# Patient Record
Sex: FEMALE | Race: BLACK OR AFRICAN AMERICAN | NOT HISPANIC OR LATINO | Employment: OTHER | URBAN - METROPOLITAN AREA
[De-identification: names, ages, dates, MRNs, and addresses within clinical notes are randomized per-mention and may not be internally consistent; named-entity substitution may affect disease eponyms.]

---

## 2017-01-25 ENCOUNTER — HOSPITAL ENCOUNTER (OUTPATIENT)
Dept: RADIOLOGY | Facility: HOSPITAL | Age: 57
Discharge: HOME | End: 2017-01-25
Attending: INTERNAL MEDICINE
Payer: COMMERCIAL

## 2017-01-25 DIAGNOSIS — R06.2 WHEEZING: ICD-10-CM

## 2017-01-25 PROCEDURE — 71020 XR CHEST PA AND LATERAL: CPT | Mod: TC

## 2017-01-25 PROCEDURE — 71020 XR CHEST PA AND LATERAL: CPT | Mod: 26,,, | Performed by: RADIOLOGY

## 2017-02-03 ENCOUNTER — HOSPITAL ENCOUNTER (OUTPATIENT)
Dept: RADIOLOGY | Facility: HOSPITAL | Age: 57
Discharge: HOME | End: 2017-02-03
Attending: INTERNAL MEDICINE
Payer: COMMERCIAL

## 2017-02-03 DIAGNOSIS — Z12.31 VISIT FOR SCREENING MAMMOGRAM: ICD-10-CM

## 2017-02-03 PROCEDURE — 77067 SCR MAMMO BI INCL CAD: CPT | Mod: 26,,, | Performed by: RADIOLOGY

## 2017-02-03 PROCEDURE — 77067 SCR MAMMO BI INCL CAD: CPT | Mod: TC

## 2018-02-07 ENCOUNTER — OFFICE VISIT (OUTPATIENT)
Dept: PLASTIC SURGERY | Facility: CLINIC | Age: 58
End: 2018-02-07
Payer: COMMERCIAL

## 2018-02-07 VITALS
TEMPERATURE: 98 F | DIASTOLIC BLOOD PRESSURE: 88 MMHG | SYSTOLIC BLOOD PRESSURE: 134 MMHG | HEIGHT: 66 IN | HEART RATE: 81 BPM | BODY MASS INDEX: 35.36 KG/M2 | WEIGHT: 220 LBS

## 2018-02-07 DIAGNOSIS — M54.50 CHRONIC BILATERAL LOW BACK PAIN WITHOUT SCIATICA: ICD-10-CM

## 2018-02-07 DIAGNOSIS — G89.29 CHRONIC NECK PAIN: ICD-10-CM

## 2018-02-07 DIAGNOSIS — N62 MACROMASTIA: Primary | ICD-10-CM

## 2018-02-07 DIAGNOSIS — G89.29 CHRONIC BILATERAL LOW BACK PAIN WITHOUT SCIATICA: ICD-10-CM

## 2018-02-07 DIAGNOSIS — R21 EXCORIATED RASH: ICD-10-CM

## 2018-02-07 DIAGNOSIS — M54.2 CHRONIC NECK PAIN: ICD-10-CM

## 2018-02-07 PROCEDURE — 3008F BODY MASS INDEX DOCD: CPT | Mod: S$GLB,,, | Performed by: SURGERY

## 2018-02-07 PROCEDURE — 99999 PR PBB SHADOW E&M-EST. PATIENT-LVL III: CPT | Mod: PBBFAC,,, | Performed by: SURGERY

## 2018-02-07 PROCEDURE — 99204 OFFICE O/P NEW MOD 45 MIN: CPT | Mod: S$GLB,,, | Performed by: SURGERY

## 2018-02-07 NOTE — PROGRESS NOTES
History & Physical    SUBJECTIVE:   Chief complaint: large breasts    History of Present Illness:  Patient is a 57 y.o. female presents with symptomatic bilateral large pendulous  breasts. She is 42G. States that she has back and neck pain for greater than 10 years. Takes muscle relaxant, ibuprofen, and tylenol for this. She also participates in physical therapy and exercise program to relieve back and neck pain for 7 years. Complaints of shoulder grooving from hussein straps and rashes under the breast for which she uses baby powder and cream, and triple oitment abx. Also has macerating rashes during summer time. She is active. Works as a disabled.    Past Medical History:   Diagnosis Date    Chronic pain     Fibromyalgia     Hypertension        History reviewed. No pertinent surgical history.    History reviewed. No pertinent family history.    Social History     Social History    Marital status:      Spouse name: N/A    Number of children: N/A    Years of education: N/A     Social History Main Topics    Smoking status: Current Some Day Smoker    Smokeless tobacco: None    Alcohol use No    Drug use: Unknown    Sexual activity: Not Asked     Other Topics Concern    None     Social History Narrative    None       Current Outpatient Prescriptions   Medication Sig Dispense Refill    atenolol (TENORMIN) 50 MG tablet Take 50 mg by mouth once daily.      duloxetine (CYMBALTA) 60 MG capsule Take 60 mg by mouth once daily.      gabapentin (NEURONTIN) 300 MG capsule Take 300 mg by mouth 3 (three) times daily.      hydrochlorothiazide (HYDRODIURIL) 25 MG tablet Take 25 mg by mouth once daily.      hydrocodone-acetaminophen 7.5-325mg (NORCO) 7.5-325 mg per tablet Take 1 tablet by mouth every 6 (six) hours as needed for Pain.      oxycodone-acetaminophen (PERCOCET) 5-325 mg per tablet Take 1 tablet by mouth every 4 (four) hours as needed for Pain. 20 tablet 0    pregabalin (LYRICA) 75 MG capsule  "Take 75 mg by mouth 2 (two) times daily.       No current facility-administered medications for this visit.        Review of patient's allergies indicates:  No Known Allergies      Review of Systems:    Review of Systems   HENT: Positive for neck pain.    Musculoskeletal: Positive for back pain.   Neurological: Positive for headaches. dizziness      OBJECTIVE:     /88   Pulse 81   Temp 98 °F (36.7 °C) (Oral)   Ht 5' 6" (1.676 m)   Wt 99.8 kg (220 lb)   BMI 35.51 kg/m²       Physical Exam:    Physical Exam   Constitutional: She is oriented to person, place, and time. She appears well-developed and well-nourished.   Neck: Normal range of motion. Neck supple. No tracheal deviation present.   Cardiovascular: Normal rate, regular rhythm and normal heart sounds.    Pulmonary/Chest: Effort normal and breath sounds normal. bilaterally enlarged breasts, evidence of previous rashes, shoulder grooving, no palpable masses, nipple everted  Abdominal: Soft. Bowel sounds are normal.   Musculoskeletal: Normal range of motion.   Neurological: She is alert and oriented to person, place, and time.   Skin: Skin is warm.           ASSESSMENT/PLAN:     1.Symptomatic Bilateral Macromastia  2. Chronic neck pain  3. Chronic back pain  4. Chronic breast rashes    PLAN:Plan    -Will need 2000 gm reduction per side, will need free nipple graft vs tattoo   -Will submit paper work for insurance approval  -Photos obtained  -Risk, benefits, and alternatives explained.    "

## 2018-03-20 ENCOUNTER — TELEPHONE (OUTPATIENT)
Dept: PLASTIC SURGERY | Facility: CLINIC | Age: 58
End: 2018-03-20

## 2018-04-09 ENCOUNTER — TELEPHONE (OUTPATIENT)
Dept: PLASTIC SURGERY | Facility: CLINIC | Age: 58
End: 2018-04-09

## 2018-04-23 ENCOUNTER — TELEPHONE (OUTPATIENT)
Dept: PLASTIC SURGERY | Facility: CLINIC | Age: 58
End: 2018-04-23

## 2018-04-23 NOTE — TELEPHONE ENCOUNTER
I've made several attempts to schedule Ms. Tripathi's surgery with no avail. The number listed is no longer in service.

## 2018-05-04 DIAGNOSIS — M54.9 BACK PAIN, UNSPECIFIED BACK LOCATION, UNSPECIFIED BACK PAIN LATERALITY, UNSPECIFIED CHRONICITY: ICD-10-CM

## 2018-05-04 DIAGNOSIS — N62 MACROMASTIA: Primary | ICD-10-CM

## 2018-05-04 DIAGNOSIS — G89.29 CHRONIC LOW BACK PAIN WITHOUT SCIATICA, UNSPECIFIED BACK PAIN LATERALITY: ICD-10-CM

## 2018-05-04 DIAGNOSIS — R21 EXCORIATED RASH: ICD-10-CM

## 2018-05-04 DIAGNOSIS — M54.50 CHRONIC LOW BACK PAIN WITHOUT SCIATICA, UNSPECIFIED BACK PAIN LATERALITY: ICD-10-CM

## 2018-05-04 DIAGNOSIS — M54.2 NECK PAIN: ICD-10-CM

## 2018-05-21 DIAGNOSIS — Z12.39 SCREENING FOR BREAST CANCER: Primary | ICD-10-CM

## 2018-05-29 ENCOUNTER — HOSPITAL ENCOUNTER (OUTPATIENT)
Dept: RADIOLOGY | Facility: HOSPITAL | Age: 58
Discharge: HOME | End: 2018-05-29
Attending: INTERNAL MEDICINE
Payer: COMMERCIAL

## 2018-05-29 VITALS — WEIGHT: 220 LBS | HEIGHT: 66 IN | BODY MASS INDEX: 35.36 KG/M2

## 2018-05-29 DIAGNOSIS — Z12.39 SCREENING FOR BREAST CANCER: ICD-10-CM

## 2018-05-29 PROCEDURE — 77063 BREAST TOMOSYNTHESIS BI: CPT | Mod: 26,,, | Performed by: RADIOLOGY

## 2018-05-29 PROCEDURE — 77067 SCR MAMMO BI INCL CAD: CPT | Mod: 26,,, | Performed by: RADIOLOGY

## 2018-05-29 PROCEDURE — 77067 SCR MAMMO BI INCL CAD: CPT | Mod: TC

## 2018-07-19 ENCOUNTER — CLINICAL SUPPORT (OUTPATIENT)
Dept: PLASTIC SURGERY | Facility: CLINIC | Age: 58
End: 2018-07-19
Payer: COMMERCIAL

## 2018-07-19 NOTE — PROGRESS NOTES
Pt arrived for pre op nurse teaching. Instructions given and questions answered. Pt verbalized understanding. Paper work given to pt to take home for reference.

## 2018-07-26 ENCOUNTER — ANESTHESIA EVENT (OUTPATIENT)
Dept: SURGERY | Facility: HOSPITAL | Age: 58
End: 2018-07-26

## 2018-07-26 DIAGNOSIS — M79.603 PAIN OF UPPER EXTREMITY, UNSPECIFIED LATERALITY: Primary | ICD-10-CM

## 2018-07-26 NOTE — ANESTHESIA PREPROCEDURE EVALUATION
Anesthesia Assessment: Preoperative EQUATION    Planned Procedure: Procedure(s) (LRB):  REDUCTION-MAMMOPLASTY-BILATERAL (Bilateral)  Requested Anesthesia Type:General  Surgeon: Sherwin Boss MD  Service: Plastics  Known or anticipated Date of Surgery:8/6/2018  Optimization:  Anesthesia Preop Clinic Assessment  Indicated    Plan:    Testing:  Hemoglobin and EKG      Navigation: Tests Scheduled.              Consults scheduled.             Results will be tracked by Preop Clinic.                  No anesthesia preop clinic visit, or consult required.                                                                                HARPER GOYAL 7/26/18 07/26/2018  Donna Tripathi is a 57 y.o., female.    Pre-op Assessment         Review of Systems  Anesthesia Hx:  No problems with previous Anesthesia    Social:  Former Smoker, Social Alcohol Use    Hematology/Oncology:  Hematology Normal   Oncology Normal     EENT/Dental:   LOSS FILLING UPPER BACK & LOWER SIDES   Cardiovascular:   Exercise tolerance: good Hypertension, well controlled    Pulmonary:  Pulmonary Normal    Renal/:  Renal/ Normal     Hepatic/GI:   GERD, well controlled    Musculoskeletal:   Arthritis     Neurological:  Neurology Normal    Endocrine:  Endocrine Normal    Psych:  Psychiatric Normal       HARPER GOYAL 7/26/18

## 2018-07-30 ENCOUNTER — HOSPITAL ENCOUNTER (OUTPATIENT)
Dept: CARDIOLOGY | Facility: HOSPITAL | Age: 58
Discharge: HOME | End: 2018-07-30
Attending: ANESTHESIOLOGY
Payer: COMMERCIAL

## 2018-07-30 DIAGNOSIS — M79.603 PAIN OF UPPER EXTREMITY, UNSPECIFIED LATERALITY: ICD-10-CM

## 2018-07-30 PROCEDURE — 93010 ELECTROCARDIOGRAM REPORT: CPT | Mod: ,,, | Performed by: INTERNAL MEDICINE

## 2018-07-30 PROCEDURE — 93005 ELECTROCARDIOGRAM TRACING: CPT

## 2018-08-02 ENCOUNTER — TELEPHONE (OUTPATIENT)
Dept: PLASTIC SURGERY | Facility: CLINIC | Age: 58
End: 2018-08-02

## 2018-08-02 NOTE — TELEPHONE ENCOUNTER
----- Message from Rhonda G Leopold, MD sent at 8/2/2018  4:02 PM CDT -----  Regarding: EKG  Called patient as requested.  Spoke to both patient and her daughter.  At this time, the patient and her daughter would like to postpone her surgery until she be evaluated by her primary care provider.  She is calling to schedule an appointment today.  She has been experiencing new onset SOB and would like to discuss this with her physician given the findings on her EKG.  They asked that Dr. Boss' office be notified of their wish to postpone.    Thanks,  Rhonda Leopold, MD

## 2018-08-06 ENCOUNTER — ANESTHESIA (OUTPATIENT)
Dept: SURGERY | Facility: HOSPITAL | Age: 58
End: 2018-08-06

## 2019-05-09 DIAGNOSIS — Z12.39 SCREENING BREAST EXAMINATION: Primary | ICD-10-CM

## 2019-06-03 ENCOUNTER — HOSPITAL ENCOUNTER (OUTPATIENT)
Dept: RADIOLOGY | Facility: HOSPITAL | Age: 59
Discharge: HOME | End: 2019-06-03
Attending: INTERNAL MEDICINE
Payer: COMMERCIAL

## 2019-06-03 DIAGNOSIS — Z12.39 SCREENING BREAST EXAMINATION: ICD-10-CM

## 2019-06-03 PROCEDURE — 77067 SCR MAMMO BI INCL CAD: CPT | Mod: TC

## 2019-06-03 PROCEDURE — 77067 SCR MAMMO BI INCL CAD: CPT | Mod: 26,,, | Performed by: RADIOLOGY

## 2019-06-03 PROCEDURE — 77067 MAMMO DIGITAL SCREENING BILAT WITH CAD: ICD-10-PCS | Mod: 26,,, | Performed by: RADIOLOGY

## 2020-09-24 DIAGNOSIS — Z12.31 BREAST CANCER SCREENING BY MAMMOGRAM: Primary | ICD-10-CM

## 2020-10-05 DIAGNOSIS — Z12.31 BREAST CANCER SCREENING BY MAMMOGRAM: Primary | ICD-10-CM

## 2021-12-08 DIAGNOSIS — R91.8 LUNG FIELD ABNORMAL FINDING ON EXAMINATION: Primary | ICD-10-CM

## 2021-12-14 ENCOUNTER — HOSPITAL ENCOUNTER (OUTPATIENT)
Dept: RADIOLOGY | Facility: HOSPITAL | Age: 61
Discharge: HOME | End: 2021-12-14
Attending: INTERNAL MEDICINE
Payer: COMMERCIAL

## 2021-12-14 DIAGNOSIS — R91.8 LUNG FIELD ABNORMAL: Primary | ICD-10-CM

## 2021-12-14 DIAGNOSIS — R91.8 LUNG FIELD ABNORMAL: ICD-10-CM

## 2021-12-14 PROCEDURE — 71046 X-RAY EXAM CHEST 2 VIEWS: CPT | Mod: 26,,, | Performed by: RADIOLOGY

## 2021-12-14 PROCEDURE — 71046 X-RAY EXAM CHEST 2 VIEWS: CPT | Mod: TC,FY

## 2021-12-14 PROCEDURE — 71046 XR CHEST PA AND LATERAL: ICD-10-PCS | Mod: 26,,, | Performed by: RADIOLOGY

## 2022-10-27 ENCOUNTER — HOSPITAL ENCOUNTER (OUTPATIENT)
Dept: RADIOLOGY | Facility: HOSPITAL | Age: 62
Discharge: HOME | End: 2022-10-27
Attending: INTERNAL MEDICINE
Payer: COMMERCIAL

## 2022-10-27 DIAGNOSIS — Z12.31 SCREENING MAMMOGRAM FOR BREAST CANCER: ICD-10-CM

## 2022-10-27 PROCEDURE — 77063 BREAST TOMOSYNTHESIS BI: CPT | Mod: TC

## 2022-10-27 PROCEDURE — 77063 MAMMO DIGITAL SCREENING BILAT WITH TOMO: ICD-10-PCS | Mod: 26,,, | Performed by: RADIOLOGY

## 2022-10-27 PROCEDURE — 77063 BREAST TOMOSYNTHESIS BI: CPT | Mod: 26,,, | Performed by: RADIOLOGY

## 2022-10-27 PROCEDURE — 77067 SCR MAMMO BI INCL CAD: CPT | Mod: 26,,, | Performed by: RADIOLOGY

## 2022-10-27 PROCEDURE — 77067 MAMMO DIGITAL SCREENING BILAT WITH TOMO: ICD-10-PCS | Mod: 26,,, | Performed by: RADIOLOGY

## 2022-10-27 PROCEDURE — 77067 SCR MAMMO BI INCL CAD: CPT | Mod: TC

## 2023-10-30 DIAGNOSIS — I10 ESSENTIAL (PRIMARY) HYPERTENSION: Primary | ICD-10-CM

## 2023-10-30 DIAGNOSIS — Z12.31 VISIT FOR SCREENING MAMMOGRAM: ICD-10-CM

## 2023-11-03 ENCOUNTER — HOSPITAL ENCOUNTER (OUTPATIENT)
Dept: RADIOLOGY | Facility: HOSPITAL | Age: 63
Discharge: HOME | End: 2023-11-03
Attending: INTERNAL MEDICINE
Payer: COMMERCIAL

## 2023-11-03 VITALS — WEIGHT: 220 LBS | HEIGHT: 66 IN | BODY MASS INDEX: 35.36 KG/M2

## 2023-11-03 DIAGNOSIS — Z12.31 VISIT FOR SCREENING MAMMOGRAM: ICD-10-CM

## 2023-11-03 DIAGNOSIS — I10 ESSENTIAL (PRIMARY) HYPERTENSION: ICD-10-CM

## 2023-11-03 PROCEDURE — 77067 MAMMO DIGITAL SCREENING BILAT WITH TOMO: ICD-10-PCS | Mod: 26,,, | Performed by: RADIOLOGY

## 2023-11-03 PROCEDURE — 77067 SCR MAMMO BI INCL CAD: CPT | Mod: 26,,, | Performed by: RADIOLOGY

## 2023-11-03 PROCEDURE — 77063 MAMMO DIGITAL SCREENING BILAT WITH TOMO: ICD-10-PCS | Mod: 26,,, | Performed by: RADIOLOGY

## 2023-11-03 PROCEDURE — 77067 SCR MAMMO BI INCL CAD: CPT | Mod: TC

## 2023-11-03 PROCEDURE — 77063 BREAST TOMOSYNTHESIS BI: CPT | Mod: 26,,, | Performed by: RADIOLOGY

## 2024-01-24 ENCOUNTER — HOSPITAL ENCOUNTER (OUTPATIENT)
Dept: RADIOLOGY | Facility: HOSPITAL | Age: 64
Discharge: HOME | End: 2024-01-24
Attending: INTERNAL MEDICINE
Payer: COMMERCIAL

## 2024-01-24 DIAGNOSIS — R04.2 COUGH WITH HEMOPTYSIS: ICD-10-CM

## 2024-01-24 DIAGNOSIS — R04.2 COUGH WITH HEMOPTYSIS: Primary | ICD-10-CM

## 2024-01-24 PROCEDURE — 71046 X-RAY EXAM CHEST 2 VIEWS: CPT | Mod: 26,,, | Performed by: RADIOLOGY

## 2024-01-24 PROCEDURE — 71046 X-RAY EXAM CHEST 2 VIEWS: CPT | Mod: TC,FY

## 2024-11-05 ENCOUNTER — TELEPHONE (OUTPATIENT)
Dept: TRANSPLANT | Facility: CLINIC | Age: 64
End: 2024-11-05
Payer: COMMERCIAL

## 2024-11-06 ENCOUNTER — TELEPHONE (OUTPATIENT)
Dept: TRANSPLANT | Facility: CLINIC | Age: 64
End: 2024-11-06
Payer: COMMERCIAL

## 2024-11-06 DIAGNOSIS — J84.9 ILD (INTERSTITIAL LUNG DISEASE): Primary | ICD-10-CM

## 2024-11-06 DIAGNOSIS — Z76.82 LUNG TRANSPLANT CANDIDATE: ICD-10-CM

## 2024-11-06 NOTE — LETTER
November 8, 2024    Clive Bailey MD  [address] [place] [address] [address]  [address]  [place] [address]  Phone: [phone]  Fax: [phone]      Dear Dr. Clive Bailey:    Patient: Donna Tripathi   MR Number: [id number]   YOB: 1960     Thank you for the referral of Donna Tripathi to our lung transplant program. An initial appointment with the transplant team has been scheduled for 11/26/24. You will receive an after-visit summary following the completion of your patients appointment in our clinic.    Thank you again for your trust in our program. If there is anything we can do for you or your staff, please feel free to contact us at [phone].    Sincerely,       Kristine Garay D.O.  Medical Director, Lung Transplant  Pulmonary & Critical Care Medicine    [facility]  [address] [place] [id number]  [phone]

## 2024-11-06 NOTE — TELEPHONE ENCOUNTER
11/7/24 - Received financial clearance for consult with PFTs and a 6 minute walk test. Contacted patient and notified her of the referral to our department. She was aware of the referral. Notified her of Dr. Garay's instructions for a consult appointment with PFTs and a 6 minute walk test. Instructed her to bring a CD copy of her CT of chest imaging with her to the appointment. Notified her of our clinic availability. She requested an appointment on the first available Thursday morning. Informed her that Brandy, our MA/ , will contact her to schedule the appointment. She verbalized her understanding of all discussed and denied having any questions or concerns.    11/6/24 - Message and clinicals sent to [facility] for financial clearance for consult with PFTs and a 6 minute walk test.     ----- Message from Kristine Garay DO sent at 11/6/2024  8:36 AM CST -----    Regarding: FW: Lung transplant referral    Appropriate for LUT.  Will need PFTs and 6MWT.  Will need CD with CT images if not available.  Thanks    ----- Message -----  From: Jessica Ross RN  Sent: 11/5/2024   1:00 PM CST  To: Kristine Garay DO  Subject: Lung transplant referral                         Lung Transplant Referral Note    Referral from: Clive Bailey MD    Lung diagnosis: ILD; (asbestosis vs CPFE vs CTD - ILD)    Age: 64 y.o.    Height/Weight/BMI:  / WT: 92.5 kg /Body mass index is 32.93 kg/m². (Height not listed)    Smoking history: Social History    Tobacco Use      Smoking status: former smoker - quit approximately 6 years ago      Smokeless tobacco: Not on file    PFT date: 08/02/2024    Spirometry is within normal limits. No obstruction.   Lung volumes reveal severe restriction (TLC < 60% predicted).   Diffusion capacity is severely reduced (<40% predicted).       CXR date: 07/19/2024  Impression:  Findings of suspected chronic interstitial lung disease, which has progressed when compared to the remote radiograph dated  6/2/2016 but was described on a chest radiograph dated 1/24/2024 and is likely not changed although this comparison examination is not available for review.      Chest CT date: 07/19/2024  Impression:   1.   Evidence of underlying interstitial lung disease characterized by subpleural cystic changes, diffuse bronchiectasis, and diffuse groundglass attenuation favoring usual interstitial pneumonia versus fibrotic nonspecific interstitial pneumonia.   2.   A component of superimposed mild interstitial edema is difficult to exclude.   3.   No acute consolidation.       Echo date:  07/19/2024  Impression:     Normal left ventricular systolic function.   Left ventricular ejection fraction is estimated at 60-65 %.   Structurally normal trileaflet aortic valve.   Normal right ventricular size measuring 3.9cm. Normal right ventricular systolic function. RVSP could not be calculated due to no tricuspid regurgitation. Tapse=17mm, S wave=10cm/s       Other pertinent medical history: fibromyalgia; GERD      Recommendations?

## 2024-11-08 ENCOUNTER — TELEPHONE (OUTPATIENT)
Dept: TRANSPLANT | Facility: CLINIC | Age: 64
End: 2024-11-08
Payer: COMMERCIAL

## 2024-11-25 ENCOUNTER — PATIENT MESSAGE (OUTPATIENT)
Dept: TRANSPLANT | Facility: CLINIC | Age: 64
End: 2024-11-25
Payer: COMMERCIAL

## 2024-11-26 ENCOUNTER — HOSPITAL ENCOUNTER (OUTPATIENT)
Dept: PULMONOLOGY | Facility: CLINIC | Age: 64
Discharge: HOME | End: 2024-11-26
Payer: COMMERCIAL

## 2024-11-26 ENCOUNTER — OFFICE VISIT (OUTPATIENT)
Dept: TRANSPLANT | Facility: CLINIC | Age: 64
End: 2024-11-26
Payer: COMMERCIAL

## 2024-11-26 VITALS
BODY MASS INDEX: 34.31 KG/M2 | SYSTOLIC BLOOD PRESSURE: 143 MMHG | WEIGHT: 201 LBS | DIASTOLIC BLOOD PRESSURE: 89 MMHG | WEIGHT: 201 LBS | BODY MASS INDEX: 34.31 KG/M2 | HEIGHT: 64 IN | HEART RATE: 85 BPM | OXYGEN SATURATION: 95 % | TEMPERATURE: 98 F | HEIGHT: 64 IN

## 2024-11-26 DIAGNOSIS — J84.9 ILD (INTERSTITIAL LUNG DISEASE): ICD-10-CM

## 2024-11-26 DIAGNOSIS — Z76.82 LUNG TRANSPLANT CANDIDATE: ICD-10-CM

## 2024-11-26 DIAGNOSIS — J84.9 ILD (INTERSTITIAL LUNG DISEASE): Primary | ICD-10-CM

## 2024-11-26 DIAGNOSIS — J96.11 CHRONIC RESPIRATORY FAILURE WITH HYPOXIA: ICD-10-CM

## 2024-11-26 PROCEDURE — 99999 PR PBB SHADOW E&M-EST. PATIENT-LVL IV: CPT | Mod: PBBFAC,TXP,, | Performed by: INTERNAL MEDICINE

## 2024-11-26 RX ORDER — ASCORBIC ACID 500 MG
500 TABLET ORAL 2 TIMES DAILY
COMMUNITY

## 2024-11-26 RX ORDER — MELOXICAM 15 MG/1
15 TABLET ORAL DAILY PRN
COMMUNITY
Start: 2024-09-03

## 2024-11-26 RX ORDER — GLIPIZIDE 10 MG/1
10 TABLET ORAL
COMMUNITY
Start: 2024-09-19

## 2024-11-26 RX ORDER — BACLOFEN 10 MG/1
10 TABLET ORAL 3 TIMES DAILY PRN
COMMUNITY
Start: 2024-07-08

## 2024-11-26 RX ORDER — OXYCODONE AND ACETAMINOPHEN 10; 325 MG/1; MG/1
1 TABLET ORAL EVERY 8 HOURS PRN
COMMUNITY
Start: 2024-11-18

## 2024-11-26 RX ORDER — METFORMIN HYDROCHLORIDE 1000 MG/1
1000 TABLET ORAL 2 TIMES DAILY
COMMUNITY
Start: 2024-09-19

## 2024-11-26 RX ORDER — CHOLECALCIFEROL (VITAMIN D3) 25 MCG
1000 TABLET ORAL DAILY
COMMUNITY

## 2024-11-26 RX ORDER — LISINOPRIL 20 MG/1
20 TABLET ORAL DAILY
COMMUNITY
Start: 2024-08-07

## 2024-11-26 RX ORDER — PANTOPRAZOLE SODIUM 40 MG/1
1 TABLET, DELAYED RELEASE ORAL EVERY MORNING
COMMUNITY
Start: 2024-10-23 | End: 2025-10-23
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
## 2024-11-26 NOTE — LETTER
November 26, 2024        [facility]  [address] [place] [address] [address]  [address]  [place] [address]  Phone: [phone]  Fax: [phone]             [facility] - Transplant 1st Fl  [address]  [facility] [address]  Phone: [phone]   Patient: Donna Tripathi   MR Number: [id number]   YOB: 1960   Date of Visit: 11/26/2024       Dear Dr. Clive Bailey    Thank you for referring Donna Tripathi to me for evaluation. Attached you will find relevant portions of my assessment and plan of care.    If you have questions, please do not hesitate to call me. I look forward to following Donna Tripathi along with you.    Sincerely,    Kristine Garay, DO    Enclosure    If you would like to receive this communication electronically, please contact [email or web address] or [phone] to request [employer] access.    [employer] is a tool which provides read-only access to select patient information with whom you have a relationship. Its easy to use and provides real time access to review your patients record including encounter summaries, notes, results, and demographic information.    If you feel you have received this communication in error or would no longer like to receive these types of communications, please e-mail [email or web address]

## 2024-11-28 LAB
DLCO SINGLE BREATH LLN: 16.43
DLCO SINGLE BREATH PRE REF: 20 %
DLCO SINGLE BREATH REF: 22.16
DLCOC SBVA LLN: 3.02
DLCOC SBVA REF: 4.49
DLCOC SINGLE BREATH LLN: 16.43
DLCOC SINGLE BREATH REF: 22.16
DLCOCSBVAULN: 5.96
DLCOCSINGLEBREATHULN: 27.89
DLCOSINGLEBREATHULN: 27.89
DLCOSINGLEBREATHZSCORE: -5.09
DLCOVA LLN: 3.02
DLCOVA PRE REF: 47.2 %
DLCOVA PRE: 2.12 ML/(MIN*MMHG*L) (ref 3.02–5.96)
DLCOVA REF: 4.49
DLCOVAULN: 5.96
ERV LLN: -16449.26
ERV PRE REF: 137.9 %
ERV REF: 0.74
ERVULN: ABNORMAL
FEF 25 75 LLN: 1.38
FEF 25 75 PRE REF: 69.5 %
FEF 25 75 REF: 2.78
FEV05 LLN: 0.93
FEV05 REF: 1.79
FEV1 FVC LLN: 67
FEV1 FVC PRE REF: 104.7 %
FEV1 FVC REF: 79
FEV1 LLN: 1.47
FEV1 PRE REF: 73.3 %
FEV1 REF: 2.05
FEV1FVCZSCORE: 0.56
FEV1ZSCORE: -1.55
FRCPLETH LLN: 1.88
FRCPLETH PREREF: 63.9 %
FRCPLETH REF: 2.71
FRCPLETHULN: 3.53
FVC LLN: 1.89
FVC PRE REF: 69.7 %
FVC REF: 2.6
FVCZSCORE: -1.82
IVC PRE: 1.58 L (ref 1.89–3.35)
IVC SINGLE BREATH LLN: 1.89
IVC SINGLE BREATH PRE REF: 60.7 %
IVC SINGLE BREATH REF: 2.6
IVCSINGLEBREATHULN: 3.35
LLN IC: -16447.98
PEF LLN: 3.32
PEF PRE REF: 154.6 %
PEF REF: 5.32
PHYSICIAN COMMENT: ABNORMAL
PRE DLCO: 4.43 ML/(MIN*MMHG) (ref 16.43–27.89)
PRE ERV: 1.02 L (ref -16449.26–16450.74)
PRE FEF 25 75: 1.93 L/S (ref 1.38–4.17)
PRE FET 100: 6.33 SEC
PRE FEV05 REF: 74.2 %
PRE FEV1 FVC: 83 % (ref 67.11–89.74)
PRE FEV1: 1.5 L (ref 1.47–2.6)
PRE FEV5: 1.33 L (ref 0.93–2.64)
PRE FRC PL: 1.73 L (ref 1.88–3.53)
PRE FVC: 1.81 L (ref 1.89–3.35)
PRE IC: 0.79 L (ref -16447.98–16452.02)
PRE PEF: 8.23 L/S (ref 3.32–7.33)
PRE REF IC: 39.2 %
PRE RV: 0.71 L (ref 1.39–2.54)
PRE TLC: 2.52 L (ref 3.95–5.93)
RAW PRE REF: 117.8 %
RAW PRE: 3.6 CMH2O*S/L (ref 3.06–3.06)
RAW REF: 3.06
REF IC: 2.02
RV LLN: 1.39
RV PRE REF: 36.1 %
RV REF: 1.97
RVTLC LLN: 31
RVTLC PRE REF: 69.2 %
RVTLC PRE: 28.17 % (ref 31.13–50.31)
RVTLC REF: 41
RVTLCULN: 50
RVULN: 2.54
SGAW PRE REF: 130.5 %
SGAW PRE: 0.13 1/(CMH2O*S) (ref 0.1–0.1)
SGAW REF: 0.1
TLC LLN: 3.95
TLC PRE REF: 51 %
TLC REF: 4.94
TLC ULN: 5.93
TLCZSCORE: -4.03
ULN IC: ABNORMAL
VA PRE: 2.09 L (ref 4.79–4.79)
VA SINGLE BREATH LLN: 4.79
VA SINGLE BREATH PRE REF: 43.7 %
VA SINGLE BREATH REF: 4.79
VASINGLEBREATHULN: 4.79
VC LLN: 1.89
VC PRE REF: 69.7 %
VC PRE: 1.81 L (ref 1.89–3.35)
VC REF: 2.6
VC ULN: 3.35

## 2024-12-17 ENCOUNTER — TELEPHONE (OUTPATIENT)
Dept: TRANSPLANT | Facility: CLINIC | Age: 64
End: 2024-12-17
Payer: COMMERCIAL

## 2024-12-17 DIAGNOSIS — J84.9 ILD (INTERSTITIAL LUNG DISEASE): Primary | ICD-10-CM

## 2024-12-17 DIAGNOSIS — Z76.82 LUNG TRANSPLANT CANDIDATE: ICD-10-CM

## 2024-12-18 NOTE — TELEPHONE ENCOUNTER
Discussed patient with Dr. Garay during the ALD / Pre-Lung Transplant Patient Review Meeting today. Instructions received to schedule a follow-up appointment in 3 months with PFTs and a 6 minute walk test. Also informed her that patient's outside images were received and uploaded in our EMR.

## 2025-01-09 ENCOUNTER — TELEPHONE (OUTPATIENT)
Dept: TRANSPLANT | Facility: CLINIC | Age: 65
End: 2025-01-09
Payer: COMMERCIAL

## 2025-02-25 ENCOUNTER — HOSPITAL ENCOUNTER (OUTPATIENT)
Dept: PULMONOLOGY | Facility: CLINIC | Age: 65
Discharge: HOME | End: 2025-02-25
Payer: COMMERCIAL

## 2025-02-25 ENCOUNTER — OFFICE VISIT (OUTPATIENT)
Dept: TRANSPLANT | Facility: CLINIC | Age: 65
End: 2025-02-25
Payer: COMMERCIAL

## 2025-02-25 VITALS
OXYGEN SATURATION: 89 % | SYSTOLIC BLOOD PRESSURE: 162 MMHG | RESPIRATION RATE: 18 BRPM | BODY MASS INDEX: 35.17 KG/M2 | WEIGHT: 206 LBS | TEMPERATURE: 99 F | BODY MASS INDEX: 35.17 KG/M2 | HEIGHT: 64 IN | DIASTOLIC BLOOD PRESSURE: 74 MMHG | HEART RATE: 82 BPM | HEIGHT: 64 IN | WEIGHT: 206 LBS

## 2025-02-25 DIAGNOSIS — Z76.82 LUNG TRANSPLANT CANDIDATE: ICD-10-CM

## 2025-02-25 DIAGNOSIS — J84.9 ILD (INTERSTITIAL LUNG DISEASE): Primary | ICD-10-CM

## 2025-02-25 DIAGNOSIS — J96.11 CHRONIC RESPIRATORY FAILURE WITH HYPOXIA: ICD-10-CM

## 2025-02-25 DIAGNOSIS — J84.9 ILD (INTERSTITIAL LUNG DISEASE): ICD-10-CM

## 2025-02-25 DIAGNOSIS — K76.0 FATTY LIVER: ICD-10-CM

## 2025-02-25 PROCEDURE — 3008F BODY MASS INDEX DOCD: CPT | Mod: CPTII,NTX,S$GLB, | Performed by: INTERNAL MEDICINE

## 2025-02-25 PROCEDURE — 1160F RVW MEDS BY RX/DR IN RCRD: CPT | Mod: CPTII,NTX,S$GLB, | Performed by: INTERNAL MEDICINE

## 2025-02-25 PROCEDURE — 3078F DIAST BP <80 MM HG: CPT | Mod: CPTII,NTX,S$GLB, | Performed by: INTERNAL MEDICINE

## 2025-02-25 PROCEDURE — 99999 PR PBB SHADOW E&M-EST. PATIENT-LVL V: CPT | Mod: PBBFAC,TXP,, | Performed by: INTERNAL MEDICINE

## 2025-02-25 PROCEDURE — 3077F SYST BP >= 140 MM HG: CPT | Mod: CPTII,NTX,S$GLB, | Performed by: INTERNAL MEDICINE

## 2025-02-25 PROCEDURE — 4010F ACE/ARB THERAPY RXD/TAKEN: CPT | Mod: CPTII,NTX,S$GLB, | Performed by: INTERNAL MEDICINE

## 2025-02-25 PROCEDURE — 99214 OFFICE O/P EST MOD 30 MIN: CPT | Mod: 25,NTX,S$GLB, | Performed by: INTERNAL MEDICINE

## 2025-02-25 PROCEDURE — 1159F MED LIST DOCD IN RCRD: CPT | Mod: CPTII,NTX,S$GLB, | Performed by: INTERNAL MEDICINE

## 2025-02-25 RX ORDER — ALBUTEROL SULFATE 90 UG/1
2 INHALANT RESPIRATORY (INHALATION) EVERY 6 HOURS PRN
COMMUNITY
Start: 2025-02-17

## 2025-02-25 RX ORDER — DICYCLOMINE HYDROCHLORIDE 20 MG/1
20 TABLET ORAL DAILY PRN
COMMUNITY
Start: 2025-01-28
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
## 2025-02-25 NOTE — LETTER
February 25, 2025        [facility] [address]  [address]  [place] [address]  Phone: [phone]  Fax: [phone]             [facility] - Transplant 1st Fl  [address]  [facility] [address]  Phone: [phone]   Patient: Donna Tripathi   MR Number: [id number]   YOB: 1960   Date of Visit: 2/25/2025       Dear Dr. [facility] Bailey    Thank you for referring Donna Tripathi to me for evaluation. Attached you will find relevant portions of my assessment and plan of care.    If you have questions, please do not hesitate to call me. I look forward to following Donna Tripathi along with you.    Sincerely,    Kristine Garay, DO    Enclosure    If you would like to receive this communication electronically, please contact [email or web address] or [phone] to request [employer] access.    [employer] is a tool which provides read-only access to select patient information with whom you have a relationship. Its easy to use and provides real time access to review your patients record including encounter summaries, notes, results, and demographic information.    If you feel you have received this communication in error or would no longer like to receive these types of communications, please e-mail [email or web address]

## 2025-02-26 LAB
DLCO SINGLE BREATH LLN: 16.43
DLCO SINGLE BREATH PRE REF: 24.6 %
DLCO SINGLE BREATH REF: 22.16
DLCOC SBVA LLN: 3.02
DLCOC SBVA REF: 4.49
DLCOC SINGLE BREATH LLN: 16.43
DLCOC SINGLE BREATH REF: 22.16
DLCOCSBVAULN: 5.96
DLCOCSINGLEBREATHULN: 27.89
DLCOSINGLEBREATHULN: 27.89
DLCOSINGLEBREATHZSCORE: -4.79
DLCOVA LLN: 3.02
DLCOVA PRE REF: 53.6 %
DLCOVA PRE: 2.4 ML/(MIN*MMHG*L) (ref 3.02–5.96)
DLCOVA REF: 4.49
DLCOVAULN: 5.96
ERV LLN: -16449.26
ERV PRE REF: 114.5 %
ERV REF: 0.74
ERVULN: ABNORMAL
FEF 25 75 LLN: 1.38
FEF 25 75 PRE REF: 61.2 %
FEF 25 75 REF: 2.78
FEV05 LLN: 0.93
FEV05 REF: 1.79
FEV1 FVC LLN: 67
FEV1 FVC PRE REF: 107.2 %
FEV1 FVC REF: 79
FEV1 LLN: 1.46
FEV1 PRE REF: 72.3 %
FEV1 REF: 2.04
FEV1FVCZSCORE: 0.86
FEV1ZSCORE: -1.61
FRCPLETH LLN: 1.88
FRCPLETH PREREF: 56.4 %
FRCPLETH REF: 2.71
FRCPLETHULN: 3.53
FVC LLN: 1.88
FVC PRE REF: 67.1 %
FVC REF: 2.59
FVCZSCORE: -1.98
IVC PRE: 1.62 L (ref 1.88–3.34)
IVC SINGLE BREATH LLN: 1.88
IVC SINGLE BREATH PRE REF: 62.5 %
IVC SINGLE BREATH REF: 2.59
IVCSINGLEBREATHULN: 3.34
LLN IC: -16447.98
PEF LLN: 3.32
PEF PRE REF: 109.9 %
PEF REF: 5.32
PHYSICIAN COMMENT: ABNORMAL
PRE DLCO: 5.45 ML/(MIN*MMHG) (ref 16.43–27.89)
PRE ERV: 0.85 L (ref -16449.26–16450.74)
PRE FEF 25 75: 1.7 L/S (ref 1.38–4.17)
PRE FET 100: 6.09 SEC
PRE FEV05 REF: 70.8 %
PRE FEV1 FVC: 84.91 % (ref 67.04–89.74)
PRE FEV1: 1.48 L (ref 1.46–2.6)
PRE FEV5: 1.26 L (ref 0.93–2.64)
PRE FRC PL: 1.53 L (ref 1.88–3.53)
PRE FVC: 1.74 L (ref 1.88–3.34)
PRE IC: 0.89 L (ref -16447.98–16452.02)
PRE PEF: 5.85 L/S (ref 3.32–7.33)
PRE REF IC: 44.2 %
PRE RV: 0.68 L (ref 1.39–2.54)
PRE TLC: 2.42 L (ref 3.95–5.93)
RAW PRE REF: 95.9 %
RAW PRE: 2.93 CMH2O*S/L (ref 3.06–3.06)
RAW REF: 3.06
REF IC: 2.02
RV LLN: 1.39
RV PRE REF: 34.6 %
RV REF: 1.97
RVTLC LLN: 31
RVTLC PRE REF: 69 %
RVTLC PRE: 28.11 % (ref 31.13–50.31)
RVTLC REF: 41
RVTLCULN: 50
RVULN: 2.54
SGAW PRE REF: 176.3 %
SGAW PRE: 0.18 1/(CMH2O*S) (ref 0.1–0.1)
SGAW REF: 0.1
TLC LLN: 3.95
TLC PRE REF: 49 %
TLC REF: 4.94
TLC ULN: 5.93
TLCZSCORE: -4.2
ULN IC: ABNORMAL
VA PRE: 2.27 L (ref 4.79–4.79)
VA SINGLE BREATH LLN: 4.79
VA SINGLE BREATH PRE REF: 47.3 %
VA SINGLE BREATH REF: 4.79
VASINGLEBREATHULN: 4.79
VC LLN: 1.88
VC PRE REF: 67.1 %
VC PRE: 1.74 L (ref 1.88–3.34)
VC REF: 2.59
VC ULN: 3.34

## 2025-03-05 ENCOUNTER — TELEPHONE (OUTPATIENT)
Dept: TRANSPLANT | Facility: CLINIC | Age: 65
End: 2025-03-05
Payer: COMMERCIAL

## 2025-03-05 DIAGNOSIS — J84.9 ILD (INTERSTITIAL LUNG DISEASE): Primary | ICD-10-CM

## 2025-03-05 DIAGNOSIS — Z76.82 LUNG TRANSPLANT CANDIDATE: ICD-10-CM

## 2025-03-05 NOTE — TELEPHONE ENCOUNTER
Discussed follow-up appointment during the ALD / Pre-Lung Transplant Patient Review Meeting today. Instructions received for a follow-up appointment in 3 months with PFTs and a 6 minute walk test.

## 2025-03-17 ENCOUNTER — HOSPITAL ENCOUNTER (OUTPATIENT)
Dept: CARDIOLOGY | Facility: HOSPITAL | Age: 65
Discharge: HOME | End: 2025-03-17
Attending: NURSE PRACTITIONER
Payer: MEDICARE

## 2025-03-17 VITALS
SYSTOLIC BLOOD PRESSURE: 202 MMHG | WEIGHT: 205.94 LBS | HEART RATE: 114 BPM | HEIGHT: 64 IN | DIASTOLIC BLOOD PRESSURE: 95 MMHG | BODY MASS INDEX: 35.16 KG/M2

## 2025-03-17 DIAGNOSIS — J96.11 CHRONIC RESPIRATORY FAILURE WITH HYPOXIA: ICD-10-CM

## 2025-03-17 DIAGNOSIS — J84.9 ILD (INTERSTITIAL LUNG DISEASE): ICD-10-CM

## 2025-03-17 DIAGNOSIS — Z76.82 LUNG TRANSPLANT CANDIDATE: ICD-10-CM

## 2025-03-17 LAB
ASCENDING AORTA: 3.05 CM
AV AREA BY CONTINUOUS VTI: 2.6 CM2
AV INDEX (PROSTH): 0.78
AV LVOT MEAN GRADIENT: 6 MMHG
AV LVOT PEAK GRADIENT: 8 MMHG
AV MEAN GRADIENT: 7 MMHG
AV PEAK GRADIENT: 13 MMHG
AV VALVE AREA BY VELOCITY RATIO: 2.6 CM²
AV VALVE AREA: 2.5 CM2
AV VELOCITY RATIO: 0.83
BSA FOR ECHO PROCEDURE: 2.05 M2
CV ECHO LV RWT: 0.45 CM
DOP CALC AO PEAK VEL: 1.8 M/S
DOP CALC AO VTI: 36.1 CM
DOP CALC LVOT AREA: 3.1 CM2
DOP CALC LVOT DIAMETER: 2 CM
DOP CALC LVOT PEAK VEL: 1.5 M/S
DOP CALC LVOT STROKE VOLUME: 88.9 CM3
DOP CALCLVOT PEAK VEL VTI: 28.3 CM
E WAVE DECELERATION TIME: 173 MS
E/A RATIO: 0.74
E/E' RATIO: 9 M/S
ECHO EF ESTIMATED: 64 %
ECHO LV POSTERIOR WALL: 1 CM (ref 0.6–1.1)
FRACTIONAL SHORTENING: 34.1 % (ref 28–44)
GLOBAL LONGITUIDAL STRAIN: 18.7 %
INTERVENTRICULAR SEPTUM: 1.2 CM (ref 0.6–1.1)
IVC DIAMETER: 1.18 CM
LA MAJOR: 6.3 CM
LA MINOR: 6.1 CM
LA WIDTH: 3.9 CM
LEFT ATRIUM SIZE: 3.4 CM
LEFT ATRIUM VOLUME INDEX MOD: 23 ML/M2
LEFT ATRIUM VOLUME INDEX: 35 ML/M2
LEFT ATRIUM VOLUME MOD: 46 ML
LEFT ATRIUM VOLUME: 70 CM3
LEFT INTERNAL DIMENSION IN SYSTOLE: 2.9 CM (ref 2.1–4)
LEFT VENTRICLE DIASTOLIC VOLUME INDEX: 43.94 ML/M2
LEFT VENTRICLE DIASTOLIC VOLUME: 87 ML
LEFT VENTRICLE MASS INDEX: 85.3 G/M2
LEFT VENTRICLE SYSTOLIC VOLUME INDEX: 16.2 ML/M2
LEFT VENTRICLE SYSTOLIC VOLUME: 32 ML
LEFT VENTRICULAR INTERNAL DIMENSION IN DIASTOLE: 4.4 CM (ref 3.5–6)
LEFT VENTRICULAR MASS: 168.9 G
LV LATERAL E/E' RATIO: 9
LV SEPTAL E/E' RATIO: 10
MV A" WAVE DURATION": 122.74 MS
MV PEAK A VEL: 1.22 M/S
MV PEAK E VEL: 0.9 M/S
OHS CV RV/LV RATIO: 1.18 CM
PULM VEIN A" WAVE DURATION": 122.74 MS
PULM VEIN S/D RATIO: 1.55
PULMONIC VEIN PEAK A VELOCITY: 0.3 M/S
PV PEAK D VEL: 0.4 M/S
PV PEAK S VEL: 0.62 M/S
RA MAJOR: 4.75 CM
RA PRESSURE ESTIMATED: 3 MMHG
RA WIDTH: 2.62 CM
RIGHT ATRIAL AREA: 10.3 CM2
RIGHT VENTRICLE DIASTOLIC BASEL DIMENSION: 5.2 CM
RV TISSUE DOPPLER FREE WALL SYSTOLIC VELOCITY 1 (APICAL 4 CHAMBER VIEW): 16.26 CM/S
SINUS: 3.42 CM
STJ: 2.73 CM
TDI LATERAL: 0.1 M/S
TDI SEPTAL: 0.09 M/S
TDI: 0.1 M/S
TRICUSPID ANNULAR PLANE SYSTOLIC EXCURSION: 3.36 CM
Z-SCORE OF LEFT VENTRICULAR DIMENSION IN END DIASTOLE: -2.62
Z-SCORE OF LEFT VENTRICULAR DIMENSION IN END SYSTOLE: -1.51

## 2025-03-17 PROCEDURE — 93356 MYOCRD STRAIN IMG SPCKL TRCK: CPT | Mod: NTX,,, | Performed by: INTERNAL MEDICINE

## 2025-03-17 PROCEDURE — 93306 TTE W/DOPPLER COMPLETE: CPT | Mod: TXP

## 2025-03-17 PROCEDURE — 93306 TTE W/DOPPLER COMPLETE: CPT | Mod: 26,NTX,, | Performed by: INTERNAL MEDICINE

## 2025-03-17 NOTE — NURSING NOTE
Pt verified by 2 identifiers.  Pt here for TTE with bubble study. Test explained, understanding verbalized. 22g IV started to Lt AC. Bubble study explained & perfomed per protocol, images obtained. IV removed, catheter intact, dressing applied. Patient tolerated procedure well without complaint.

## 2025-03-18 ENCOUNTER — RESULTS FOLLOW-UP (OUTPATIENT)
Dept: TRANSPLANT | Facility: HOSPITAL | Age: 65
End: 2025-03-18

## 2025-03-27 ENCOUNTER — TELEPHONE (OUTPATIENT)
Dept: TRANSPLANT | Facility: CLINIC | Age: 65
End: 2025-03-27
Payer: MEDICARE

## 2025-04-16 ENCOUNTER — TELEPHONE (OUTPATIENT)
Dept: TRANSPLANT | Facility: CLINIC | Age: 65
End: 2025-04-16
Payer: MEDICARE

## 2025-04-21 ENCOUNTER — TELEPHONE (OUTPATIENT)
Dept: TRANSPLANT | Facility: CLINIC | Age: 65
End: 2025-04-21
Payer: MEDICARE

## 2025-04-21 NOTE — TELEPHONE ENCOUNTER
4/22/25 - Contacted patient and informed her that Dr. Garay reviewed the echo results. Informed her that she does not have any evidence of pulmonary hypertension or increased pressures in her lungs. Also informed her that her left heart looks good. Informed her that there is evidence of a shunt, but that can be expected with lung disease. Informed her that she needs to see hepatology to make sure that she doesn't have cirrhosis. She verbalized her understanding and confirmed her hepatology appointment next month on 5/7/25.    ----- Message from Kristine Garay DO sent at 4/21/2025 10:23 AM CDT -----    Regarding: RE: TTE results    There's no pulmonary hypertension.  Left heart looks good.  There's a shunt which can be expected with lung disease.  Needs to see hepatology as well to make sure she does not have cirrhosis contributing.  Thanks    ----- Message -----  From: Jessica Ross RN  Sent: 4/16/2025   3:36 PM CDT  To: Kristine Garay DO  Subject: TTE results                                      Patient called stating that she saw her echo results from March were abnormal. Can you please review the results and let me know your recommendations if any?Thanks,sl

## 2025-05-07 ENCOUNTER — OFFICE VISIT (OUTPATIENT)
Dept: HEPATOLOGY | Facility: CLINIC | Age: 65
End: 2025-05-07
Payer: MEDICARE

## 2025-05-07 ENCOUNTER — TELEPHONE (OUTPATIENT)
Dept: HEPATOLOGY | Facility: CLINIC | Age: 65
End: 2025-05-07
Payer: MEDICARE

## 2025-05-07 DIAGNOSIS — R74.01 ELEVATED AST (SGOT): ICD-10-CM

## 2025-05-07 DIAGNOSIS — E11.69 TYPE 2 DIABETES MELLITUS WITH OTHER SPECIFIED COMPLICATION, WITHOUT LONG-TERM CURRENT USE OF INSULIN: ICD-10-CM

## 2025-05-07 DIAGNOSIS — Z76.82 LUNG TRANSPLANT CANDIDATE: ICD-10-CM

## 2025-05-07 DIAGNOSIS — E66.9 OBESITY, UNSPECIFIED CLASS, UNSPECIFIED OBESITY TYPE, UNSPECIFIED WHETHER SERIOUS COMORBIDITY PRESENT: ICD-10-CM

## 2025-05-07 DIAGNOSIS — J84.9 ILD (INTERSTITIAL LUNG DISEASE): ICD-10-CM

## 2025-05-07 DIAGNOSIS — Z78.9: ICD-10-CM

## 2025-05-07 DIAGNOSIS — Z87.19 HISTORY OF FATTY INFILTRATION OF LIVER: ICD-10-CM

## 2025-05-07 PROBLEM — E11.9 TYPE 2 DIABETES MELLITUS, WITHOUT LONG-TERM CURRENT USE OF INSULIN: Status: ACTIVE | Noted: 2025-05-07

## 2025-05-07 NOTE — PROGRESS NOTES
Hepatology Consult Note    Referring provider: Ishmael Alvarez  PCP: Laureano Cuevas MD    The patient location is: [place]  The chief complaint leading to consultation is: fatty liver, lung transplant candidate    Visit type: audiovisual    Face to Face time with patient: 10 minutes.  45 minutes of total time spent on the encounter, which includes face to face time and non-face to face time preparing to see the patient (eg, review of tests), Obtaining and/or reviewing separately obtained history, Documenting clinical information in the electronic or other health record, Independently interpreting results (not separately reported) and communicating results to the patient/family/caregiver, or Care coordination (not separately reported).     Each patient to whom he or she provides medical services by telemedicine is:  (1) informed of the relationship between the physician and patient and the respective role of any other health care provider with respect to management of the patient; and (2) notified that he or she may decline to receive medical services by telemedicine and may withdraw from such care at any time.      HPI:  Donna Tripathi is a 64 y.o. female with fatty liver, ILD undergoing lung transplant evaluation, who was referred to Hepatology Clinic for fatty liver. Reports having fatty liver for 1 1/2 years.     Regarding risk factors for liver disease, the patient denies prior history of EtOH abuse, illicit drug use, blood transfusions. 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 MASLD risk factors: obesity, NIDDM type 2 x4 years, HTN, Denies history of HLD, SARAY. Denies family history of liver disease or liver cancer.     The patient denies prior episodes jaundice. The patient denies prior evaluation by hepatologist, liver biopsy.    Denies prior bariatric surgery.    Past Medical History:   Diagnosis Date    Chronic pain     Chronic respiratory failure with hypoxia     Fibromyalgia     Hypertension      Interstitial lung disease     Type 2 diabetes mellitus without complications        Past Surgical History:   Procedure Laterality Date    BACK SURGERY      HYSTERECTOMY         Family History   Problem Relation Name Age of Onset    Breast cancer Sister         Social History[1]    Current Medications[2]    Review of patient's allergies indicates:  No Known Allergies         There were no vitals filed for this visit.  There is no height or weight on file to calculate BMI.    Physical Exam  Constitutional:       General: She is not in acute distress.     Appearance: She is not toxic-appearing.   Eyes:      General: No scleral icterus.  Skin:     Coloration: Skin is not jaundiced.   Neurological:      General: No focal deficit present.      Mental Status: She is alert and oriented to person, place, and time.   Psychiatric:         Behavior: Behavior normal.         Thought Content: Thought content normal.       LABS: I personally reviewed pertinent laboratory findings.    Lab Results   Component Value Date    WBC 6.15 02/07/2012    HGB 12.7 07/30/2018    HCT 37.0 02/07/2012    MCV 96.1 (H) 02/07/2012     02/07/2012       Lab Results   Component Value Date     10/23/2024    K 4.5 10/23/2024     02/07/2012    CO2 28 10/23/2024    BUN 16.6 10/23/2024    CREATININE 0.61 10/23/2024    CALCIUM 9.4 10/23/2024    ANIONGAP 5 (L) 10/23/2024    ESTGFRAFRICA >60 02/07/2012    EGFRNONAA >60 02/07/2012       Lab Results   Component Value Date    ALT 36 10/23/2024    AST 52 (H) 10/23/2024    ALKPHOS 89 02/07/2012    BILITOT 0.7 10/23/2024       Lab Results   Component Value Date    HEPAIGM Negative 10/03/2011    HEPBIGM Negative 10/03/2011    HEPCAB Negative 10/03/2011       Lab Results   Component Value Date    LIN Pos, Titer to follow (A) 10/03/2011        Computed MELD 3.0 unavailable. One or more values for this score either were not found within the given timeframe or did not fit some other  criterion.  Computed MELD-Na unavailable. One or more values for this score either were not found within the given timeframe or did not fit some other criterion.       IMAGING: I personally reviewed imaging studies.      Assessment:  Donna Tripathi is a 64 y.o. female with fatty liver, ILD undergoing lung transplant evaluation, who was referred to Hepatology Clinic for fatty liver.   Per chart review, isolated AST elevation is noted (<1.5xULN). The rest of the liver enzymes and bilirubin are within normal limits. Platelet count is normal. Serological workup notable for LIN 1:1280. On outside ultrasound abdomen (12/12/24), the liver is normal in size and homogenous, slightly nodular contour, spleen is normal in size, no ascites.    Patient with prior diagnosis of fatty liver. Plan to complete serological workup for liver disease, as well as non-invasive measures of fibrosis. Can consider liver biopsy based on these results.    I have counseled the patient regarding the importance of weight loss and adhering to a healthy diet to prevent progression of liver disease.    1. Elevated AST (SGOT)    2. History of fatty infiltration of liver    3. Obesity, unspecified class, unspecified obesity type, unspecified whether serious comorbidity present    4. Type 2 diabetes mellitus with other specified complication, without long-term current use of insulin    5. Seldom use of alcohol    6. ILD (interstitial lung disease)    7. Lung transplant candidate      Recommendations:  - LFTs, INR  - PETH  - ASMA, AMA, immunoglobulins   - A1AT phenotype  - HAV IgG, HBsAg, HBsAb, HBc Total Ab, HCV ab, HIV  - FibroScan   - Consider liver biopsy   - Weight loss (goal 5-10% body weight)  - Mediterranean diet     Return to clinic in 3 months.    I have sent communication to the referring physician and/or primary care provider.    Keya Kearney MD  Staff Physician  Hepatology and Liver Transplant  [facility] [place]           [1]   Social History  Tobacco Use    Smoking status: Former     Types: Cigarettes     Passive exposure: Past    Smokeless tobacco: Never   Substance Use Topics    Alcohol use: No    Drug use: Never   [2]   Current Outpatient Medications   Medication Sig Dispense Refill    albuterol (PROVENTIL/VENTOLIN HFA) 90 mcg/actuation inhaler Inhale 2 puffs into the lungs every 6 (six) hours as needed.      ascorbic acid, vitamin C, (VITAMIN C) 500 MG tablet Take 500 mg by mouth once daily.      atenolol (TENORMIN) 50 MG tablet Take 50 mg by mouth once daily.      baclofen (LIORESAL) 10 MG tablet Take 10 mg by mouth 3 (three) times daily as needed.      cyanocobalamin, vitamin B-12, (VITAMIN B-12) 50 mcg tablet Take 100 mcg by mouth once daily.      dicyclomine (BENTYL) 20 mg tablet Take 20 mg by mouth daily as needed.      gabapentin (NEURONTIN) 300 MG capsule Take 800 mg by mouth 3 (three) times daily as needed.      glipiZIDE (GLUCOTROL) 10 MG tablet Take 10 mg by mouth daily with breakfast.      hydrochlorothiazide (HYDRODIURIL) 25 MG tablet Take 25 mg by mouth once daily.      lisinopriL (PRINIVIL,ZESTRIL) 20 MG tablet Take 20 mg by mouth once daily.      meloxicam (MOBIC) 15 MG tablet Take 15 mg by mouth daily as needed.      metFORMIN (GLUCOPHAGE) 1000 MG tablet Take 1,000 mg by mouth 2 (two) times daily.      oxyCODONE-acetaminophen (PERCOCET)  mg per tablet Take 1 tablet by mouth every 8 (eight) hours as needed.      pantoprazole (PROTONIX) 40 MG tablet Take 1 tablet by mouth every morning.      vitamin D (VITAMIN D3) 1000 units Tab Take 5,000 Units by mouth once daily.       No current facility-administered medications for this visit.

## 2025-05-07 NOTE — TELEPHONE ENCOUNTER
Patient was called and offered virtual, patient voiced agreement to changing appointment to virtual instead. Appt has been changed.    SHAWANDA Wan    ----- Message from Kasandra sent at 5/7/2025 10:56 AM CDT -----  Regarding: reschedule  Contact: [phone]  Reschedule Existing Appointment Current appt date:5/7 Type of appt :NP Physician:Caio  Reason for rescheduling:weather  Caller:Donna Tripathi  Contact Preference:[phone] accidentally cancelled the appointment that was scheduled for today Needing to have rescheduled for Mondays or Thursday mornings

## 2025-05-13 ENCOUNTER — TELEPHONE (OUTPATIENT)
Dept: HEPATOLOGY | Facility: CLINIC | Age: 65
End: 2025-05-13
Payer: MEDICARE

## 2025-05-13 NOTE — TELEPHONE ENCOUNTER
"Patient was called and appointments have been scheduled. Patient voiced agreement to all times, dates, and locations scheduled.    SHAWANDA Wan    ----- Message from Keya Kearney MD sent at 5/12/2025 12:12 PM CDT -----  No need to get those labs then. Thank you for letting me know.  ----- Message -----  From: Soco Chong MA  Sent: 5/12/2025  11:21 AM CDT  To: Keya Kearney MD    Her insurance will not cover the PETH due to "CPT(R)  is not covered. Noncovered dx list: J84.9, Z87.19, Z76.82, Z78.9." or the Pt-INR due to " CPT(R) 36882 is not covered. Noncovered dx list: J84.9, Z87.19, Z76.82."Is there anything we can do to have it covered?  ----- Message -----  From: Keya Kearney MD  Sent: 5/7/2025   2:31 PM CDT  To: [facility] Staff    Labs and fibroscan at patient's earliest convenience. RTC in 3 months in person. Thanks.  "

## 2025-05-19 ENCOUNTER — TELEPHONE (OUTPATIENT)
Dept: TRANSPLANT | Facility: CLINIC | Age: 65
End: 2025-05-19
Payer: MEDICARE

## 2025-05-19 NOTE — TELEPHONE ENCOUNTER
"----- Message from Mario sent at 5/19/2025 10:39 AM CDT -----  Regarding: call back  Consult/Advisory:  Name Of Caller: Self   Contact Preference?:[phone]   What is the nature of the call?: Calling to speak w/  Jessica in regards to having shortness of breath requesting call back    Additional Notes:"Thank you for all that you do for our patients"    Attempted to contact patient. No answer. Left a message requesting a return call.    Received a return call from patient. She stated that she is needing more oxygen with exertion, but she hasn't seen Dr. Bailey in awhile because her appointments have been rescheduled. She inquired as to what she needs to do. Informed patient that she will need to have documentation of her oxygen needs and orders from her provider. Informed her that she can have pulmonary rehab send her oxygen requirements to Dr. Bailey. She verbalized her understanding of all discussed.  "

## 2025-05-27 ENCOUNTER — LAB VISIT (OUTPATIENT)
Dept: LAB | Facility: HOSPITAL | Age: 65
End: 2025-05-27
Attending: INTERNAL MEDICINE
Payer: MEDICARE

## 2025-05-27 ENCOUNTER — HOSPITAL ENCOUNTER (OUTPATIENT)
Dept: PULMONOLOGY | Facility: CLINIC | Age: 65
Discharge: HOME | End: 2025-05-27
Payer: MEDICARE

## 2025-05-27 ENCOUNTER — PROCEDURE VISIT (OUTPATIENT)
Dept: HEPATOLOGY | Facility: CLINIC | Age: 65
End: 2025-05-27
Payer: MEDICARE

## 2025-05-27 ENCOUNTER — RESULTS FOLLOW-UP (OUTPATIENT)
Dept: TRANSPLANT | Facility: CLINIC | Age: 65
End: 2025-05-27

## 2025-05-27 ENCOUNTER — OFFICE VISIT (OUTPATIENT)
Dept: TRANSPLANT | Facility: CLINIC | Age: 65
End: 2025-05-27
Payer: MEDICARE

## 2025-05-27 VITALS
WEIGHT: 202 LBS | OXYGEN SATURATION: 95 % | WEIGHT: 202 LBS | DIASTOLIC BLOOD PRESSURE: 83 MMHG | HEIGHT: 64 IN | HEART RATE: 112 BPM | HEIGHT: 64 IN | TEMPERATURE: 98 F | SYSTOLIC BLOOD PRESSURE: 180 MMHG | BODY MASS INDEX: 34.49 KG/M2 | BODY MASS INDEX: 34.49 KG/M2

## 2025-05-27 DIAGNOSIS — J84.9 ILD (INTERSTITIAL LUNG DISEASE): ICD-10-CM

## 2025-05-27 DIAGNOSIS — Z76.82 LUNG TRANSPLANT CANDIDATE: ICD-10-CM

## 2025-05-27 DIAGNOSIS — J84.9 ILD (INTERSTITIAL LUNG DISEASE): Primary | ICD-10-CM

## 2025-05-27 DIAGNOSIS — Z87.19 HISTORY OF FATTY INFILTRATION OF LIVER: ICD-10-CM

## 2025-05-27 DIAGNOSIS — J96.11 CHRONIC RESPIRATORY FAILURE WITH HYPOXIA: ICD-10-CM

## 2025-05-27 DIAGNOSIS — K76.0 FATTY LIVER: ICD-10-CM

## 2025-05-27 LAB
ALBUMIN SERPL BCP-MCNC: 3.1 G/DL (ref 3.5–5.2)
ALP SERPL-CCNC: 185 UNIT/L (ref 40–150)
ALT SERPL W/O P-5'-P-CCNC: 28 UNIT/L (ref 10–44)
AST SERPL-CCNC: 49 UNIT/L (ref 11–45)
BILIRUB DIRECT SERPL-MCNC: 0.3 MG/DL (ref 0.1–0.3)
BILIRUB SERPL-MCNC: 0.7 MG/DL (ref 0.1–1)
HAV AB SER QL IA: REACTIVE
HBV CORE AB SERPL QL IA: NORMAL
HBV SURFACE AB SER-ACNC: <3 MIU/ML
HBV SURFACE AB SERPL IA-ACNC: NORMAL M[IU]/ML
HBV SURFACE AG SERPL QL IA: NORMAL
HCV AB SERPL QL IA: NORMAL
HIV 1+2 AB+HIV1 P24 AG SERPL QL IA: NORMAL
IGA SERPL-MCNC: 467 MG/DL (ref 40–350)
IGG SERPL-MCNC: 2972 MG/DL (ref 650–1600)
IGM SERPL-MCNC: 211 MG/DL (ref 50–300)
PROT SERPL-MCNC: 8.5 GM/DL (ref 6–8.4)

## 2025-05-27 PROCEDURE — 86015 ACTIN ANTIBODY EACH: CPT | Mod: TXP

## 2025-05-27 PROCEDURE — 36415 COLL VENOUS BLD VENIPUNCTURE: CPT | Mod: TXP

## 2025-05-27 PROCEDURE — 82040 ASSAY OF SERUM ALBUMIN: CPT | Mod: TXP

## 2025-05-27 PROCEDURE — 82784 ASSAY IGA/IGD/IGG/IGM EACH: CPT | Mod: 59,TXP

## 2025-05-27 PROCEDURE — 84075 ASSAY ALKALINE PHOSPHATASE: CPT | Mod: TXP

## 2025-05-27 PROCEDURE — 86790 VIRUS ANTIBODY NOS: CPT | Mod: TXP

## 2025-05-27 PROCEDURE — 1160F RVW MEDS BY RX/DR IN RCRD: CPT | Mod: CPTII,NTX,S$GLB, | Performed by: PHYSICIAN ASSISTANT

## 2025-05-27 PROCEDURE — 99999 PR PBB SHADOW E&M-EST. PATIENT-LVL IV: CPT | Mod: PBBFAC,TXP,, | Performed by: PHYSICIAN ASSISTANT

## 2025-05-27 PROCEDURE — 86381 MITOCHONDRIAL ANTIBODY EACH: CPT | Mod: TXP

## 2025-05-27 PROCEDURE — 86706 HEP B SURFACE ANTIBODY: CPT | Mod: TXP

## 2025-05-27 PROCEDURE — 87340 HEPATITIS B SURFACE AG IA: CPT | Mod: TXP

## 2025-05-27 PROCEDURE — 99214 OFFICE O/P EST MOD 30 MIN: CPT | Mod: 25,NTX,S$GLB, | Performed by: PHYSICIAN ASSISTANT

## 2025-05-27 PROCEDURE — 3077F SYST BP >= 140 MM HG: CPT | Mod: CPTII,NTX,S$GLB, | Performed by: PHYSICIAN ASSISTANT

## 2025-05-27 PROCEDURE — 3008F BODY MASS INDEX DOCD: CPT | Mod: CPTII,NTX,S$GLB, | Performed by: PHYSICIAN ASSISTANT

## 2025-05-27 PROCEDURE — 4010F ACE/ARB THERAPY RXD/TAKEN: CPT | Mod: CPTII,NTX,S$GLB, | Performed by: PHYSICIAN ASSISTANT

## 2025-05-27 PROCEDURE — 87389 HIV-1 AG W/HIV-1&-2 AB AG IA: CPT | Mod: TXP

## 2025-05-27 PROCEDURE — 91200 LIVER ELASTOGRAPHY: CPT | Mod: NTX,S$GLB,, | Performed by: INTERNAL MEDICINE

## 2025-05-27 PROCEDURE — 86803 HEPATITIS C AB TEST: CPT | Mod: TXP

## 2025-05-27 PROCEDURE — 3079F DIAST BP 80-89 MM HG: CPT | Mod: CPTII,NTX,S$GLB, | Performed by: PHYSICIAN ASSISTANT

## 2025-05-27 PROCEDURE — 86704 HEP B CORE ANTIBODY TOTAL: CPT | Mod: TXP

## 2025-05-27 PROCEDURE — 82104 ALPHA-1-ANTITRYPSIN PHENO: CPT | Mod: TXP

## 2025-05-27 PROCEDURE — 1159F MED LIST DOCD IN RCRD: CPT | Mod: CPTII,NTX,S$GLB, | Performed by: PHYSICIAN ASSISTANT

## 2025-05-27 RX ORDER — GABAPENTIN 800 MG/1
800 TABLET ORAL 3 TIMES DAILY
COMMUNITY

## 2025-05-27 RX ORDER — IBUPROFEN 800 MG/1
800 TABLET, FILM COATED ORAL 3 TIMES DAILY PRN
COMMUNITY
Start: 2025-04-28

## 2025-05-27 NOTE — LETTER
May 27, 2025        [facility] [address]  [address]  [place] [address]  Phone: [phone]  Fax: [phone]             [facility] Trans[facility] 1st Fl  [address]  [facility] [address]  Phone: [phone]   Patient: Donna Tripathi   MR Number: [id number]   YOB: 1960   Date of Visit: 5/27/2025       Dear Dr. [facility] Bailey    Thank you for referring Donna Tripathi to me for evaluation. Attached you will find relevant portions of my assessment and plan of care.    If you have questions, please do not hesitate to call me. I look forward to following Donna Tripathi along with you.    Sincerely,    AGUSTIN Lutzosure    If you would like to receive this communication electronically, please contact [email or web address] or [phone] to request [employer] access.    [employer] is a tool which provides read-only access to select patient information with whom you have a relationship. Its easy to use and provides real time access to review your patients record including encounter summaries, notes, results, and demographic information.    If you feel you have received this communication in error or would no longer like to receive these types of communications, please e-mail [email or web address]
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
## 2025-05-27 NOTE — PROCEDURES
FibroScan (Vibration Controlled Transient Elastography)    Date/Time: 5/27/2025 8:45 AM    Performed by: Tracy Valdes MA  Authorized by: Keya Kearney MD    Diagnosis:  NAFLD and Other    Probe:  XL    Universal Protocol: Patient's identity, procedure and site were verified, confirmatory pause was performed.  Discussed procedure including risks and potential complications.  Questions answered.  Patient verbalizes understanding and wishes to proceed with VCTE.     Procedure: After providing explanations of the procedure, patient was placed in the supine position with right arm in maximum abduction to allow optimal exposure of right lateral abdomen.  Patient was briefly assessed, Testing was performed in the mid-axillary location, 50Hz Shear Wave pulses were applied and the resulting Shear Wave and Propagation Speed detected with a 3.5 MHz ultrasonic signal, using the FibroScan probe, Skin to liver capsule distance and liver parenchyma were accessed during the entire examination with the FibroScan probe, Patient was instructed to breathe normally and to abstain from sudden movements during the procedure, allowing for random measurements of liver stiffness. At least 10 Shear Waves were produced, Individual measurements of each Shear Wave were calculated.  Patient tolerated the procedure well with no complications.  Meets discharge criteria as was dismissed.  Rates pain 0 out of 10.  Patient will follow up with ordering provider to review results.    Findings  Median liver stiffness score:  34.1  CAP Reading: dB/m:  186    IQR/med %:  10  Interpretation  Fibrosis interpretation is based on medial liver stiffness - Kilopascal (kPa).    Fibrosis Stage:  F4  Steatosis interpretation is based on controlled attenuation parameter - (dB/m).    Steatosis Grade:  <S1

## 2025-05-27 NOTE — PROCEDURES
Donna Tripathi is a 64 y.o.   female patient, who presents for a 6 minute walk test ordered by DO Tanisha.  The diagnosis is Interstitial Lung Disease.  The patient's BMI is 34.7 kg/m2.  Predicted distance (lower limit of normal) is 288.35 meters.      Test Results:    The test was completed without stopping.  The total time walked was 360 seconds.  During walking, the patient reported:  Dyspnea.  The patient used a walker and supplemental oxygen during testing.     05/27/2025---------Distance: 304.8 meters (1000 feet)     Lap Walk Time O2 Sat % Supplemental Oxygen Heart Rate Blood Pressure Shailesh Scale Comment   Pre-exercise  (Resting) 0 0 98 % 10 L/M 107 bpm 189/80 mmHg 3    During Exercise 1 63 sec 92 % 10 L/M 116 bpm      During Exercise 2 129 sec 85 % 10 L/M 126 bpm      During Exercise 3 208 sec 83 % 10 L/M 126 bpm      During Exercise 4 275 sec 83 % 10 L/M 126 bpm      End of Exercise 5 360 sec 81 % 10 L/M 127 bpm 194/83 mmHg 4    Post-exercise  (Recovery)   98 % 10 L/M  111 bpm        Recovery Time: 83 seconds    Performing nurse/tech: Ravi GOYAL      PREVIOUS STUDY:   02/25/2025---------Distance: 243.84 meters (800 feet)       Lap Walk Time  sec O2 Sat % Supplemental Oxygen  lpm Heart Rate  bpm Blood Pressure  mmHg Shailesh Scale   Pre-exercise  (Resting) 0   91 10 85 167/88 0.5   During Exercise 1 66 91 10 93       During Exercise 2 137 85 10 97       During Exercise 3 210 79 10 105       End of Exercise   360 81 10 103 178/112 0   Post-exercise  (Recovery)     98 10 85 171/96         CLINICAL INTERPRETATION:  Six minute walk distance is 304.8 meters (1000 feet) with somewhat heavy dyspnea.  During exercise, there was significant desaturation while breathing supplemental oxygen.  Both blood pressure and heart rate remained stable with walking.  Hypertension and Tachycardia were present prior to exercise.  The patient did not report non-pulmonary symptoms during exercise.  Since the previous study in February  2025, exercise capacity is significantly improved.  Based upon age and body mass index, exercise capacity is normal.

## 2025-05-28 ENCOUNTER — TELEPHONE (OUTPATIENT)
Dept: HEPATOLOGY | Facility: CLINIC | Age: 65
End: 2025-05-28
Payer: MEDICARE

## 2025-05-28 DIAGNOSIS — R93.2 ABNORMAL FINDING ON IMAGING OF LIVER: ICD-10-CM

## 2025-05-28 DIAGNOSIS — Z76.82 LUNG TRANSPLANT CANDIDATE: Primary | ICD-10-CM

## 2025-05-28 DIAGNOSIS — K74.02 ADVANCED HEPATIC FIBROSIS: ICD-10-CM

## 2025-05-28 LAB
DLCO SINGLE BREATH LLN: 16.43
DLCO SINGLE BREATH PRE REF: 21 %
DLCO SINGLE BREATH REF: 22.16
DLCOC SBVA LLN: 3.02
DLCOC SBVA REF: 4.49
DLCOC SINGLE BREATH LLN: 16.43
DLCOC SINGLE BREATH REF: 22.16
DLCOCSBVAULN: 5.96
DLCOCSINGLEBREATHULN: 27.89
DLCOSINGLEBREATHULN: 27.89
DLCOSINGLEBREATHZSCORE: -5.02
DLCOVA LLN: 3.02
DLCOVA PRE REF: 52.6 %
DLCOVA PRE: 2.36 ML/(MIN*MMHG*L) (ref 3.02–5.96)
DLCOVA REF: 4.49
DLCOVAULN: 5.96
ERVN2 LLN: -16449.26
ERVN2 PRE REF: 43.8 %
ERVN2 PRE: 0.32 L (ref -16449.26–16450.74)
ERVN2 REF: 0.74
ERVN2ULN: ABNORMAL
FEF 25 75 LLN: 1.38
FEF 25 75 PRE REF: 67.5 %
FEF 25 75 REF: 2.78
FEV05 LLN: 0.93
FEV05 REF: 1.79
FEV1 FVC LLN: 67
FEV1 FVC PRE REF: 107.5 %
FEV1 FVC REF: 79
FEV1 LLN: 1.46
FEV1 PRE REF: 67.1 %
FEV1 REF: 2.04
FEV1FVCZSCORE: 0.9
FEV1ZSCORE: -1.89
FRCN2 LLN: 1.88
FRCN2 PRE REF: 33.6 %
FRCN2 REF: 2.71
FRCN2ULN: 3.53
FVC LLN: 1.87
FVC PRE REF: 62.1 %
FVC REF: 2.58
FVCZSCORE: -2.29
ICN2REF: 2.02
IVC PRE: 1.58 L (ref 1.87–3.33)
IVC SINGLE BREATH LLN: 1.87
IVC SINGLE BREATH PRE REF: 61 %
IVC SINGLE BREATH REF: 2.58
IVCSINGLEBREATHULN: 3.33
LLN IC N2: -16447.98
PEF LLN: 3.32
PEF PRE REF: 132.8 %
PEF REF: 5.32
PHYSICIAN COMMENT: ABNORMAL
PRE DLCO: 4.66 ML/(MIN*MMHG) (ref 16.43–27.89)
PRE FEF 25 75: 1.88 L/S (ref 1.38–4.17)
PRE FET 100: 6.26 SEC
PRE FEV05 REF: 67.7 %
PRE FEV1 FVC: 85.13 % (ref 66.98–89.74)
PRE FEV1: 1.37 L (ref 1.46–2.59)
PRE FEV5: 1.21 L (ref 0.93–2.64)
PRE FRC N2: 0.91 L (ref 1.88–3.53)
PRE FVC: 1.6 L (ref 1.87–3.33)
PRE IC N2: 1.28 L (ref -16447.98–16452.02)
PRE PEF: 7.07 L/S (ref 3.32–7.33)
PRE REF IC N2: 63.4 %
RVN2 LLN: 1.39
RVN2 PRE REF: 29.7 %
RVN2 PRE: 0.58 L (ref 1.39–2.54)
RVN2 REF: 1.97
RVN2TLCN2 LLN: 31.13
RVN2TLCN2 PRE REF: 65.5 %
RVN2TLCN2 PRE: 26.69 % (ref 31.13–50.31)
RVN2TLCN2 REF: 40.72
RVN2TLCN2ULN: 50.31
RVN2ULN: 2.54
TLCN2 LLN: 3.95
TLCN2 PRE REF: 44.3 %
TLCN2 PRE: 2.19 L (ref 3.95–5.93)
TLCN2 REF: 4.94
TLCN2ULN: 5.93
TLCN2ZSCORE: -4.58
ULN IC N2: ABNORMAL
VA PRE: 1.97 L (ref 4.79–4.79)
VA SINGLE BREATH LLN: 4.79
VA SINGLE BREATH PRE REF: 41.2 %
VA SINGLE BREATH REF: 4.79
VASINGLEBREATHULN: 4.79
VCMAXN2 LLN: 1.87
VCMAXN2 PRE REF: 62.1 %
VCMAXN2 PRE: 1.6 L (ref 1.87–3.33)
VCMAXN2 REF: 2.58
VCMAXN2ULN: 3.33

## 2025-05-28 NOTE — TELEPHONE ENCOUNTER
Patient was called and CT Scan has been scheduled. Pt voiced agreement and understanding to time, date, and location scheduled.    SHAWANDA Wan

## 2025-05-28 NOTE — TELEPHONE ENCOUNTER
Contacted patient to inform her about FibroScan results suggesting advanced fibrosis (F4). At this point recommend transjugular liver biopsy with pressures for further evaluation / risk assessment for lung transplant. Patient is amenable to biopsy. All questions were answered.    Keya Kearney MD  Staff Physician  Hepatology and Liver Transplant  [facility]  [facility]

## 2025-05-29 ENCOUNTER — PATIENT MESSAGE (OUTPATIENT)
Dept: INTERVENTIONAL RADIOLOGY/VASCULAR | Facility: CLINIC | Age: 65
End: 2025-05-29
Payer: MEDICARE

## 2025-05-29 DIAGNOSIS — K74.02 ADVANCED HEPATIC FIBROSIS: ICD-10-CM

## 2025-05-29 DIAGNOSIS — Z76.82 LUNG TRANSPLANT CANDIDATE: Primary | ICD-10-CM

## 2025-05-29 DIAGNOSIS — R93.2 ABNORMAL FINDING ON IMAGING OF LIVER: ICD-10-CM

## 2025-05-29 LAB
A1AT PHENOTYP SERPL-IMP: NORMAL BANDS
A1AT SERPL NEPH-MCNC: 154 MG/DL (ref 100–190)
SMOOTH MUSCLE AB TITR SER IF: NORMAL {TITER}

## 2025-06-02 ENCOUNTER — PATIENT MESSAGE (OUTPATIENT)
Dept: INTERVENTIONAL RADIOLOGY/VASCULAR | Facility: HOSPITAL | Age: 65
End: 2025-06-02
Payer: MEDICARE

## 2025-06-03 ENCOUNTER — TELEPHONE (OUTPATIENT)
Dept: TRANSPLANT | Facility: CLINIC | Age: 65
End: 2025-06-03
Payer: MEDICARE

## 2025-06-03 LAB — MITOCHONDRIA AB TITR SER IF: ABNORMAL {TITER}

## 2025-06-05 ENCOUNTER — TELEPHONE (OUTPATIENT)
Dept: INTERVENTIONAL RADIOLOGY/VASCULAR | Facility: HOSPITAL | Age: 65
End: 2025-06-05
Payer: MEDICARE

## 2025-06-06 ENCOUNTER — HOSPITAL ENCOUNTER (OUTPATIENT)
Dept: RADIOLOGY | Facility: HOSPITAL | Age: 65
Discharge: HOME | End: 2025-06-06
Attending: INTERNAL MEDICINE
Payer: MEDICARE

## 2025-06-06 ENCOUNTER — RESULTS FOLLOW-UP (OUTPATIENT)
Dept: HEPATOLOGY | Facility: CLINIC | Age: 65
End: 2025-06-06

## 2025-06-06 DIAGNOSIS — R93.2 ABNORMAL FINDING ON IMAGING OF LIVER: ICD-10-CM

## 2025-06-06 DIAGNOSIS — K74.02 ADVANCED HEPATIC FIBROSIS: ICD-10-CM

## 2025-06-06 DIAGNOSIS — Z76.82 LUNG TRANSPLANT CANDIDATE: ICD-10-CM

## 2025-06-06 PROCEDURE — 25500020 PHARM REV CODE 255: Mod: TXP | Performed by: INTERNAL MEDICINE

## 2025-06-06 PROCEDURE — 74170 CT ABD WO CNTRST FLWD CNTRST: CPT | Mod: TC,TXP

## 2025-06-06 RX ADMIN — IOHEXOL 75 ML: 350 INJECTION, SOLUTION INTRAVENOUS at 11:06

## 2025-06-09 ENCOUNTER — HOSPITAL ENCOUNTER (OUTPATIENT)
Dept: INTERVENTIONAL RADIOLOGY/VASCULAR | Facility: HOSPITAL | Age: 65
Discharge: HOME | End: 2025-06-09
Attending: FAMILY MEDICINE
Payer: MEDICARE

## 2025-06-09 VITALS
SYSTOLIC BLOOD PRESSURE: 182 MMHG | TEMPERATURE: 98 F | RESPIRATION RATE: 23 BRPM | HEART RATE: 66 BPM | OXYGEN SATURATION: 99 % | DIASTOLIC BLOOD PRESSURE: 77 MMHG

## 2025-06-09 DIAGNOSIS — R93.2 ABNORMAL FINDING ON IMAGING OF LIVER: ICD-10-CM

## 2025-06-09 DIAGNOSIS — K74.02 ADVANCED HEPATIC FIBROSIS: ICD-10-CM

## 2025-06-09 DIAGNOSIS — Z76.82 LUNG TRANSPLANT CANDIDATE: ICD-10-CM

## 2025-06-09 LAB — POCT GLUCOSE: 157 MG/DL (ref 70–110)

## 2025-06-09 PROCEDURE — 88307 TISSUE EXAM BY PATHOLOGIST: CPT | Mod: TC,TXP | Performed by: INTERNAL MEDICINE

## 2025-06-09 PROCEDURE — C1894 INTRO/SHEATH, NON-LASER: HCPCS | Mod: TXP

## 2025-06-09 PROCEDURE — 82962 GLUCOSE BLOOD TEST: CPT | Mod: TXP

## 2025-06-09 PROCEDURE — C1889 IMPLANT/INSERT DEVICE, NOC: HCPCS | Mod: TXP

## 2025-06-09 PROCEDURE — 25500020 PHARM REV CODE 255: Mod: TXP | Performed by: FAMILY MEDICINE

## 2025-06-09 PROCEDURE — 75970 VASCULAR BIOPSY: CPT | Mod: TC,TXP

## 2025-06-09 PROCEDURE — C1769 GUIDE WIRE: HCPCS | Mod: TXP

## 2025-06-09 PROCEDURE — 63600175 PHARM REV CODE 636 W HCPCS: Mod: TXP | Performed by: RADIOLOGY

## 2025-06-09 RX ORDER — ASPIRIN 81 MG/1
81 TABLET ORAL DAILY
COMMUNITY

## 2025-06-09 RX ORDER — LIDOCAINE HYDROCHLORIDE 10 MG/ML
INJECTION, SOLUTION EPIDURAL; INFILTRATION; INTRACAUDAL; PERINEURAL
Status: COMPLETED | OUTPATIENT
Start: 2025-06-09 | End: 2025-06-09

## 2025-06-09 RX ORDER — FENTANYL CITRATE 50 UG/ML
INJECTION, SOLUTION INTRAMUSCULAR; INTRAVENOUS
Status: COMPLETED | OUTPATIENT
Start: 2025-06-09 | End: 2025-06-09

## 2025-06-09 RX ORDER — MIDAZOLAM HYDROCHLORIDE 1 MG/ML
INJECTION, SOLUTION INTRAMUSCULAR; INTRAVENOUS
Status: COMPLETED | OUTPATIENT
Start: 2025-06-09 | End: 2025-06-09

## 2025-06-09 RX ORDER — LIDOCAINE HYDROCHLORIDE 10 MG/ML
1 INJECTION, SOLUTION EPIDURAL; INFILTRATION; INTRACAUDAL; PERINEURAL ONCE
Status: DISCONTINUED | OUTPATIENT
Start: 2025-06-09 | End: 2025-06-10 | Stop reason: HOSPADM

## 2025-06-09 RX ORDER — SODIUM CHLORIDE 9 MG/ML
INJECTION, SOLUTION INTRAVENOUS CONTINUOUS
Status: DISCONTINUED | OUTPATIENT
Start: 2025-06-09 | End: 2025-06-10 | Stop reason: HOSPADM

## 2025-06-09 RX ADMIN — FENTANYL CITRATE 50 MCG: 50 INJECTION, SOLUTION INTRAMUSCULAR; INTRAVENOUS at 10:06

## 2025-06-09 RX ADMIN — MIDAZOLAM HYDROCHLORIDE 0.5 MG: 2 INJECTION, SOLUTION INTRAMUSCULAR; INTRAVENOUS at 10:06

## 2025-06-09 RX ADMIN — MIDAZOLAM HYDROCHLORIDE 1 MG: 2 INJECTION, SOLUTION INTRAMUSCULAR; INTRAVENOUS at 10:06

## 2025-06-09 RX ADMIN — FENTANYL CITRATE 25 MCG: 50 INJECTION, SOLUTION INTRAMUSCULAR; INTRAVENOUS at 10:06

## 2025-06-09 RX ADMIN — IOHEXOL 20 ML: 300 INJECTION, SOLUTION INTRAVENOUS at 10:06

## 2025-06-09 RX ADMIN — LIDOCAINE HYDROCHLORIDE 5 ML: 10 SOLUTION INTRAVENOUS at 10:06

## 2025-06-09 NOTE — CARE UPDATE
patient arrived to MPU 7 via stretcher in NAD, report received from JAY JAY Valdez, see chart for vital signs and assessment, will continue to monitor patient until recovery complete.

## 2025-06-09 NOTE — PLAN OF CARE
Transjugular liver biopsy procedure completed. Pt tolerated procedure well.Patient AAOx3, no distress noted, respirations even and unlabored. Pt to be transported to MPU for post-procedural sedation recovery per MD. Report to be given at bedside to RN.

## 2025-06-09 NOTE — PROCEDURES
Radiology Post-Procedure Note    Pre Op Diagnosis: Liver dysfunction    Post Op Diagnosis: Same    Procedure: Transjugular liver biposy    Procedure performed by: Deven Trivedi MD    Written Informed Consent Obtained: Yes    Specimen Removed: YES 3 19g core specimens    Estimated Blood Loss: Minimal    Findings: Local anesthesia and moderate sedation were used.    A right-sided transjugular approach was used to performed hepatic venography, pressure measurements and liver biopsy.  Hepatic wedge pressure was 19, free hepatic vein pressure 11, right atrial pressure 6 indicating a transhepatic gradient of 8.  3 random specimens of the right hepatic lobe were obtained and sent to pathology for further evaluation.    Hemostasis of the right internal jugular vein was achieved using manual pressure and there was no hematoma.    The patient tolerated the procedure well and there were no complications.  Please see Imaging report for further details.    Deven Trivedi MD  Interventional Radiologist  Department of Radiology

## 2025-06-09 NOTE — DISCHARGE SUMMARY
Radiology Discharge Summary      Hospital Course: No complications    Admit Date: 6/9/2025  Discharge Date: 06/09/2025     Instructions Given to Patient: Yes  Diet: Resume prior diet  Activity: activity as tolerated    Description of Condition on Discharge: Stable  Vital Signs (Most Recent): Temp: 98.1 °F (36.7 °C) (06/09/25 1050)  Pulse: 66 (06/09/25 1135)  Resp: (!) 23 (06/09/25 1135)  BP: (!) 182/77 (06/09/25 1135)  SpO2: 99 % (06/09/25 1135)    Discharge Disposition: Home    Discharge Diagnosis: Liver dysfunction s/p transjugular liver biopsy     Follow-up: ZEV Trivedi MD  Interventional Radiologist  Department of Radiology

## 2025-06-09 NOTE — CARE UPDATE
1 hour post procedure monitoring complete at this time, dressing to procedure site remains CDI, discharge instructions reviewed with patient and daughter, educational handouts/AVS also provided for reference, IV to right hand removed without difficulty, catheter tip intact, patient transported to 2nd floor parking garage via wheelchair in NAD.

## 2025-06-09 NOTE — H&P
Radiology History & Physical      SUBJECTIVE:     Chief Complaint: liver fibrosis.    History of Present Illness:  Donna Tripathi is a 64 y.o. female who presents for transjugular liver biopsy.    Past Medical History:   Diagnosis Date    Chronic pain     Chronic respiratory failure with hypoxia     Fibromyalgia     Hypertension     Interstitial lung disease     Type 2 diabetes mellitus without complications      Past Surgical History:   Procedure Laterality Date    BACK SURGERY      HYSTERECTOMY         Home Meds:   Prior to Admission medications    Medication Sig Start Date End Date Taking? Authorizing Provider   albuterol (PROVENTIL/VENTOLIN HFA) 90 mcg/actuation inhaler Inhale 2 puffs into the lungs every 6 (six) hours as needed. 2/17/25   Provider, Historical   ascorbic acid, vitamin C, (VITAMIN C) 500 MG tablet Take 500 mg by mouth once daily.    Provider, Historical   atenolol (TENORMIN) 50 MG tablet Take 50 mg by mouth once daily.    Provider, Historical   baclofen (LIORESAL) 10 MG tablet Take 10 mg by mouth 3 (three) times daily as needed. 7/8/24   Provider, Historical   cyanocobalamin, vitamin B-12, (VITAMIN B-12) 50 mcg tablet Take 100 mcg by mouth once daily.    Provider, Historical   dicyclomine (BENTYL) 20 mg tablet Take 20 mg by mouth daily as needed. 1/28/25   Provider, Historical   gabapentin (NEURONTIN) 800 MG tablet Take 800 mg by mouth 3 (three) times daily.    Provider, Historical   glipiZIDE (GLUCOTROL) 10 MG tablet Take 10 mg by mouth daily with breakfast. 9/19/24   Provider, Historical   hydrochlorothiazide (HYDRODIURIL) 25 MG tablet Take 25 mg by mouth once daily.    Provider, Historical   ibuprofen (ADVIL,MOTRIN) 800 MG tablet Take 800 mg by mouth 3 (three) times daily as needed. 4/28/25   Provider, Historical   lisinopriL (PRINIVIL,ZESTRIL) 20 MG tablet Take 20 mg by mouth once daily. 8/7/24   Provider, Historical   meloxicam (MOBIC) 15 MG tablet Take 15 mg by mouth daily as needed.  "9/3/24   Provider, Historical   metFORMIN (GLUCOPHAGE) 1000 MG tablet Take 1,000 mg by mouth 2 (two) times daily. 9/19/24   Provider, Historical   oxyCODONE-acetaminophen (PERCOCET)  mg per tablet Take 1 tablet by mouth every 8 (eight) hours as needed. 11/18/24   Provider, Historical   pantoprazole (PROTONIX) 40 MG tablet Take 1 tablet by mouth every morning. 10/23/24 10/23/25  Provider, Historical   vitamin D (VITAMIN D3) 1000 units Tab Take 5,000 Units by mouth once daily.    Provider, Historical   amitriptyline (ELAVIL) 100 MG tablet Take 100 mg by mouth.  8/3/18  Provider, Historical   omeprazole (PRILOSEC) 20 MG capsule Take 20 mg by mouth.  8/3/18  Provider, Historical     Anticoagulants/Antiplatelets: reviewed    Allergies: Review of patient's allergies indicates:  No Known Allergies  Sedation History:  no adverse reactions    Review of Systems:   Hematological: no known coagulopathies  Respiratory: no shortness of breath  Cardiovascular: no chest pain  Gastrointestinal: no abdominal pain  Genito-Urinary: no dysuria  Musculoskeletal: negative  Neurological: no TIA or stroke symptoms         OBJECTIVE:     Vital Signs (Most Recent)       Physical Exam:  ASA: 2  Mallampati: 2    General: no acute distress  Mental Status: alert and oriented to person, place and time  HEENT: normocephalic, atraumatic  Chest: unlabored breathing  Abdomen: nondistended  Extremity: moves all extremities    Laboratory  No results found for: "INR", "PT", "PTT"    Lab Results   Component Value Date    WBC 6.15 02/07/2012    HGB 12.7 07/30/2018    HCT 37.0 02/07/2012    MCV 96.1 (H) 02/07/2012     02/07/2012      Lab Results   Component Value Date    GLU 71 02/07/2012     07/20/2024    K 4.2 07/20/2024     02/07/2012    CO2 26 07/20/2024    BUN 18.6 07/20/2024    CREATININE 0.7 06/06/2025    CALCIUM 9.6 07/20/2024    ALT 28 05/27/2025    AST 49 (H) 05/27/2025    ALBUMIN 3.1 (L) 05/27/2025    BILITOT 0.7 " 05/27/2025    BILIDIR 0.3 05/27/2025       ASSESSMENT/PLAN:     Sedation Plan: up to moderate.  Patient will undergo transjugular liver biopsy.    Trey Hernandez MD  Department of Radiology, PGY-3

## 2025-06-09 NOTE — PLAN OF CARE
Pt arrived to IR room 189 for transjugular liver biopsy. Pt oriented to unit and staff. Plan of care reviewed with patient, patient verbalizes understanding. Comfort measures utilized. Pt safely transferred from stretcher to procedural table. Fall risk reviewed with patient, fall risk interventions maintained. Safety strap applied, positioner pillows utilized to minimize pressure points. Blankets applied. Pt prepped and draped utilizing standard sterile technique. Patient placed on continuous monitoring, as required by sedation policy. Timeouts completed utilizing standard universal time-out, per department and facility policy. RN to remain at bedside, continuous monitoring maintained. Pt resting comfortably. Denies pain/discomfort. Will continue to monitor. See flow sheets for monitoring, medication administration, and updates.

## 2025-06-09 NOTE — DISCHARGE INSTRUCTIONS
TRANSJUGULAR LIVER BIOPSY DISCHARGE EDUCATION    Information:   A Transjugular Liver biopsy is a procedure in which a biopsy of your liver is taken from a vein accessed in your neck. A needle is used to remove small amounts of liver tissue to evaluate for Liver function. Contrast is also used to visualized blood flow within veins of your liver. Pressures are also taken within the blood vessels and heart.     What should I expect after the liver biopsy?    There may be some soreness around your neck access site.    You may return to work after 24 hours unless your primary doctor instructs you otherwise.    You do not have any diet restrictions because of this procedure but should continue any that were given to you by any other doctors.    Continue all previously prescribed medications with the exception of Coumadin/warfarin which should be resumed after 24 hours. Pradaxa, Xarelto, Eliquis or Savaysa can be resumed after 48 hours.     Bathing & Wound Care:    You may shower after 24 hours; do not tub bath or submerge in water for 3 days (bath tub, hot tub, swimming pool, river or any other body of water).    Dressing to stay on 2-3 days (clean and dry)    If the biopsy site(s) become red, tender, swollen, or starts to drain, contact us.    Avoid heavy lifting and strenuous activity for 3 days.     Follow-up visit information:   Your ordering physician will typically get your biopsy results in three to five business days. If you do not hear from the ordering physician in 7 business days, please call his/her office to set up an appointment to review the results. Follow up with Interventional Radiology is not usually necessary.       Occasionally, a situation will require prompt attention and an emergency room visit is necessary:    Sudden chest pain, shortness of breath, or fainting    Increasing redness, swelling or drainage from the biopsy site    Increasing pain not relieved by medication    Bleeding or drainage  from the needle site that is saturating the dressing    You have shaking, chills and/or a temperature over 100.3°F    New, sudden difficulty breathing    Drop in blood pressure, and/or light-headed feeling    Interventional Radiology Clinic    For complications   [phone]. Monday - Friday, 8:00 am - 4:00 pm    [phone] After hours and on holidays. Ask to speak with the interventional radiologist on call.     For Scheduling   [phone] Monday - Friday, 8:00 am - 4:00 pm

## 2025-06-09 NOTE — PLAN OF CARE
Patient admitted to SSCU. Initial assessment completed by this RN. VS obtained. 20 g IV inserted to R hand. Plan of care reviewed with patient. Patient verbalizes understanding and agrees with plan of care. MADELIN

## 2025-06-11 LAB
DHEA SERPL-MCNC: NORMAL
ESTROGEN SERPL-MCNC: NORMAL PG/ML
INSULIN SERPL-ACNC: NORMAL U[IU]/ML
LAB AP CLINICAL INFORMATION: NORMAL
LAB AP GROSS DESCRIPTION: NORMAL
LAB AP PERFORMING LOCATION(S): NORMAL
LAB AP REPORT FOOTNOTES: NORMAL
T3RU NFR SERPL: NORMAL %

## 2025-06-17 ENCOUNTER — TELEPHONE (OUTPATIENT)
Dept: HEPATOLOGY | Facility: CLINIC | Age: 65
End: 2025-06-17
Payer: MEDICARE

## 2025-06-17 DIAGNOSIS — K74.3 PRIMARY BILIARY CHOLANGITIS: Primary | ICD-10-CM

## 2025-06-17 RX ORDER — URSODIOL 300 MG/1
600 CAPSULE ORAL 2 TIMES DAILY
Qty: 120 CAPSULE | Refills: 11 | Status: SHIPPED | OUTPATIENT
Start: 2025-06-17 | End: 2026-06-17

## 2025-06-17 NOTE — TELEPHONE ENCOUNTER
Contacted the patient over the phone to discuss labs and liver biopsy results.     Labs are notable for ALP elevation and AMA+. This is consistent with diagnosis of primary biliary cholangitis. TJLBx with pressures performed on 6/9/25. HVPG measured at 8mmHg. Liver biopsy showed chronic hepatitis with mild portal/periportal activity, bile ductular proliferation and periportal copper deposition, and stage 4 out of 4 fibrosis. At this point it is not possible to definitely determine etiology of cirrhosis since biopsy shows findings that are less specific in the setting of advanced hepatic fibrosis. 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    Notably, TTE with bubble study in 2024 showed extracardiac right to left shunting, likely intrapulmonary. In the presence of liver disease and subclinical portal hypertension, this finding suggests hepatopulmonary syndrome.    Regarding lung transplant candidacy, I believe this patient would be high risk for lung transplant alone given presence of cirrhosis, subclinical portal hypertension and concerns for hepatopulmonary syndrome. If proceeding with transplant, would likely benefit from combined lung and liver transplant.    Recommendations:   - Discuss case with liver transplant team   - Start UDCA 13-15mg/Kg/day  - Hepatology Clinic f/u  8/25/25    Keya Kearney MD  Staff Physician  Hepatology and Liver Transplant  [facility]  [facility]

## 2025-06-20 ENCOUNTER — TELEPHONE (OUTPATIENT)
Dept: HEPATOLOGY | Facility: CLINIC | Age: 65
End: 2025-06-20
Payer: MEDICARE

## 2025-06-20 NOTE — TELEPHONE ENCOUNTER
Donna OLIVIER Bhumi  [id number]    Presenting Hepatologist: Keya Kearney MD    Indication for review: Treatment recommendations     Diagnosis: ILD, lung transplant eval and new diagnosis of cirrhosis of unclear etiology    Summary: 64 y.o. female with fatty liver, ILD undergoing lung transplant evaluation, who was referred to Hepatology Clinic for fatty liver.     Labs are notable for ALP elevation and AMA+. This is consistent with diagnosis of primary biliary cholangitis. TJLBx with pressures performed on 6/9/25. HVPG measured at 8mmHg. Liver biopsy showed chronic hepatitis with mild portal/periportal activity, bile ductular proliferation and periportal copper deposition, and stage 4 out of 4 fibrosis. At this point it is not possible to definitely determine etiology of cirrhosis since biopsy shows findings that are less specific in the setting of advanced hepatic fibrosis. 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     TTE with bubble study in 2024 showed extracardiac right to left shunting, likely intrapulmonary. In the presence of liver disease and subclinical portal hypertension, this finding suggests hepatopulmonary syndrome.    Regarding lung transplant candidacy, I believe this patient would be high risk for lung transplant alone given presence of cirrhosis, subclinical portal hypertension and concerns for hepatopulmonary syndrome. If proceeding with transplant, would likely benefit from combined lung and liver transplant. However, would like to discuss with Liver Transplant Selection Committee.    Labs:      Latest Ref Rng & Units 6/6/2025    10:41 AM 5/27/2025     8:51 AM 7/20/2024     5:58 AM 7/19/2024     8:08 AM   Lab Results for Liver Discussion   Creatinine 0.5 - 1.4 mg/dL 0.7   0.63  0.65    Total Bilirubin 0.1 - 1.0 mg/dL  0.7  0.8  0.9    AST 11 - 45 unit/L  49  35  54    ALT 10 - 44 unit/L  28  25  28    Alk Phos 40 - 150 unit/L  185      Albumin 3.5 - 5.2 g/dL  3.1  3.5  3.8    Sodium 135 -  146 mmol/L   138  138        Current Discussion/Plan:

## 2025-06-24 ENCOUNTER — CONFERENCE (OUTPATIENT)
Dept: TRANSPLANT | Facility: CLINIC | Age: 65
End: 2025-06-24
Payer: MEDICARE

## 2025-06-24 NOTE — TELEPHONE ENCOUNTER
Dr. Kearney presented this patient's case to Liver Transplant Discussion Committee. The patient has advanced bridging fibrosis on liver biopsy. The committee agreed that patient is too high risk for lung transplant per Hepatology perspective.  Patient could be referred to [facility] or [facility].  This message will be forwarded to UNRY Ross RN and MICKEY Blanco RN, lung transplant coordinators.

## 2025-06-24 NOTE — TELEPHONE ENCOUNTER
Subjective     Patient ID: Donna Tripathi is a 64 y.o. female.    Chief Complaint: No chief complaint on file.    HPI  Review of Systems       Objective     Physical Exam       Assessment and Plan     {There are no diagnoses linked to this encounter. (Refresh or delete this SmartLink)}    ***

## 2025-06-25 ENCOUNTER — TELEPHONE (OUTPATIENT)
Dept: TRANSPLANT | Facility: CLINIC | Age: 65
End: 2025-06-25
Payer: MEDICARE

## 2025-06-25 NOTE — TELEPHONE ENCOUNTER
----- Message from Chelita Pacheco sent at 6/24/2025  6:53 PM CDT -----  Sending this to the two of you.  Not sure who needed to know this.  Dr. Kearney will probably call Dr. Shapley.    Dr. Kearney presented this patient's case to Liver Transplant Discussion Committee. The patient has advanced bridging fibrosis on liver biopsy. The committee agreed that patient is too high risk for lung transplant per Hepatology perspective.  Patient could be referred to [facility] or [facility].  This message will be forwarded to NURY Ross RN and MICKEY Blanco RN, lung transplant coordinators.    6/25/25 - Message forwarded to Dr. Garay.

## 2025-06-26 ENCOUNTER — TELEPHONE (OUTPATIENT)
Dept: HEPATOLOGY | Facility: CLINIC | Age: 65
End: 2025-06-26
Payer: MEDICARE

## 2025-06-26 NOTE — TELEPHONE ENCOUNTER
Contacted Ms. Thompson over the phone to inform her about recommendations from liver transplant team. Patient is too high risk to proceed with lung transplant alone given presence of biopsy-proven cirrhosis and portal hypertension. For this reason, we recommend being evaluated for dual liver and lung transplant. I recommended seeking a second opinion at [facility] or [facility]. I provided the patient with contact information for [facility].    Patient understood the recommendations. All questions were answered.    Keya Kearney MD  Staff Physician  Hepatology and Liver Transplant  [facility]  [facility]

## 2025-06-30 ENCOUNTER — TELEPHONE (OUTPATIENT)
Dept: TRANSPLANT | Facility: CLINIC | Age: 65
End: 2025-06-30
Payer: MEDICARE

## 2025-06-30 NOTE — TELEPHONE ENCOUNTER
7/1/25 - Contacted patient. She confirmed that she spoke with Dr. Kearney. She is aware that she is not a candidate for lung transplant here. Informed her that since she is not a candidate for lung transplant here, we will cancel her follow-up appointment with Dr. Garay on 8/26/25. Patient verbalized her understanding and agreed with the appointment cancellation since she is not a lung transplant candidate.    ----- Message from Kristine Garay DO sent at 6/27/2025  9:18 AM CDT -----    Yes please.  She follows with Dr. Clive Bailey and he's aware.  Thanks    ----- Message -----  From: Jessica Ross RN  Sent: 6/26/2025   4:49 PM CDT  To: Kristine Garay, DO    I'm assuming we can cancel her August appointment in LUT clinic?  ----- Message -----  From: Kristine Garay DO  Sent: 6/26/2025   4:17 PM CDT  To: Jessica Ross RN    Yes she did let me know.  Unfortunately we won't be able to proceed with LUT here.  Thanks  ----- Message -----  From: Jessica Ross RN  Sent: 6/25/2025   4:00 PM CDT  To: Kristine Garay, DO    I'm not sure if Dr. Kearney informed you of the below decision, but wanted you to be aware.  ----- Message -----  From: Chelita Pacheco  Sent: 6/24/2025   6:55 PM CDT  To: Ivette Blanco, JAY JAY; Jessica Ross RN    Sending this to the two of you.  Not sure who needed to know this.  Dr. Kearney will probably call Dr. Shapley.    Dr. Kearney presented this patient's case to Liver Transplant Discussion Committee. The patient has advanced bridging fibrosis on liver biopsy. The committee agreed that patient is too high risk for lung transplant per Hepatology perspective.  [facility].  This message will be forwarded to NURY Ross RN and MICKEY Blanco RN, lung transplant coordinators.

## 2025-07-17 ENCOUNTER — TELEPHONE (OUTPATIENT)
Dept: TRANSPLANT | Facility: CLINIC | Age: 65
End: 2025-07-17
Payer: MEDICARE

## 2025-07-17 NOTE — TELEPHONE ENCOUNTER
----- Message from Keya Kearney MD sent at 7/17/2025  1:41 PM CDT -----  Hi,     I received a call from this patient wondering if we would be able to send a referral to [facility] Lung Transplant program... I said I would ask you guys. The fax number is [phone].         7/21/25 - Received a call from patient inquiring if a referral was sent to [facility]. Informed patient that I was out of the office on Friday, so I will send the referral today. Informed her that her insurance may not be accepted in [place]. Patient requested that we send a referral to [facility] and [facility]. Informed her that the referrals will be sent today. Provided her with the phone number to [facility]. She verbalized her understanding of all discussed.    Lung transplant referral sent to [facility] and [facility] per patient's request.

## 2025-07-18 ENCOUNTER — TELEPHONE (OUTPATIENT)
Dept: TRANSPLANT | Facility: CLINIC | Age: 65
End: 2025-07-18
Payer: MEDICARE

## 2025-07-18 NOTE — TELEPHONE ENCOUNTER
----- Message from Kristine Garay DO sent at 7/18/2025  6:35 AM CDT -----  Rocio we can send a referral.  I don't know if they take [employer].  ----- Message -----  From: Keya Kearney MD  Sent: 7/17/2025   1:43 PM CDT  To: Jessica Ross RN; Kristine Garay,#    Hi,     I received a call from this patient wondering if we would be able to send a referral to [facility] Lung Transplant program... I said I would ask you guys. The fax number is [phone].     MZ

## 2025-07-21 ENCOUNTER — TELEPHONE (OUTPATIENT)
Dept: RHEUMATOLOGY | Facility: CLINIC | Age: 65
End: 2025-07-21
Payer: MEDICARE

## 2025-07-28 ENCOUNTER — TELEPHONE (OUTPATIENT)
Dept: TRANSPLANT | Facility: CLINIC | Age: 65
End: 2025-07-28
Payer: MEDICARE

## 2025-07-28 NOTE — TELEPHONE ENCOUNTER
Copied from CRM #[id number]. Topic: General Inquiry - Patient Advice  >> Jul 28, 2025  2:59 PM Vickie wrote:  Consult/Advisory     Name Of Caller: robe eaton         Contact Preference: [phone]     Nature of call: would like to advise that she received the referral but patient insurance is out of network. Please call to advise    Attempted to contact Yenny, but the after hours operators answered the phone. Informed that I would call back tomorrow since it wasn't an emergency.    7/29/25 - Attempted to contact Yenny. Informed that Yenny has left for the day and to call back tomorrow.    Contacted patient. Informed her that her insurance is not accepted at [facility] nor [facility]. Patient stated that she is aware. She stated that she spoke with someone at both [facility] and [facility].

## 2025-08-25 ENCOUNTER — OFFICE VISIT (OUTPATIENT)
Dept: HEPATOLOGY | Facility: CLINIC | Age: 65
End: 2025-08-25
Payer: MEDICARE

## 2025-08-25 ENCOUNTER — PATIENT MESSAGE (OUTPATIENT)
Dept: HEPATOLOGY | Facility: CLINIC | Age: 65
End: 2025-08-25

## 2025-08-25 DIAGNOSIS — R93.2 ABNORMAL FINDING ON IMAGING OF LIVER: ICD-10-CM

## 2025-08-25 DIAGNOSIS — Z87.19 HISTORY OF FATTY INFILTRATION OF LIVER: ICD-10-CM

## 2025-08-25 DIAGNOSIS — E66.9 OBESITY, UNSPECIFIED CLASS, UNSPECIFIED OBESITY TYPE, UNSPECIFIED WHETHER SERIOUS COMORBIDITY PRESENT: ICD-10-CM

## 2025-08-25 DIAGNOSIS — E11.69 TYPE 2 DIABETES MELLITUS WITH OTHER SPECIFIED COMPLICATION, WITHOUT LONG-TERM CURRENT USE OF INSULIN: ICD-10-CM

## 2025-08-25 DIAGNOSIS — K74.69 COMPENSATED LIVER DISEASE: ICD-10-CM

## 2025-08-25 DIAGNOSIS — K74.3 PRIMARY BILIARY CHOLANGITIS: ICD-10-CM

## 2025-08-25 PROCEDURE — 99214 OFFICE O/P EST MOD 30 MIN: CPT | Mod: 95,,, | Performed by: INTERNAL MEDICINE

## 2025-08-27 ENCOUNTER — LAB VISIT (OUTPATIENT)
Dept: LAB | Facility: HOSPITAL | Age: 65
End: 2025-08-27
Attending: INTERNAL MEDICINE
Payer: MEDICARE

## 2025-08-27 DIAGNOSIS — Z87.19 HISTORY OF FATTY INFILTRATION OF LIVER: ICD-10-CM

## 2025-08-27 DIAGNOSIS — E66.9 OBESITY, UNSPECIFIED CLASS, UNSPECIFIED OBESITY TYPE, UNSPECIFIED WHETHER SERIOUS COMORBIDITY PRESENT: ICD-10-CM

## 2025-08-27 DIAGNOSIS — R93.2 ABNORMAL FINDING ON IMAGING OF LIVER: ICD-10-CM

## 2025-08-27 DIAGNOSIS — K74.69 COMPENSATED LIVER DISEASE: ICD-10-CM

## 2025-08-27 DIAGNOSIS — K74.3 PRIMARY BILIARY CHOLANGITIS: ICD-10-CM

## 2025-08-27 DIAGNOSIS — E11.69 TYPE 2 DIABETES MELLITUS WITH OTHER SPECIFIED COMPLICATION, WITHOUT LONG-TERM CURRENT USE OF INSULIN: ICD-10-CM

## 2025-08-27 LAB
ABSOLUTE EOSINOPHIL (OHS): 0.17 K/UL
ABSOLUTE MONOCYTE (OHS): 0.46 K/UL (ref 0.3–1)
ABSOLUTE NEUTROPHIL COUNT (OHS): 3.02 K/UL (ref 1.8–7.7)
AFP SERPL-MCNC: 3.6 NG/ML
ALBUMIN SERPL BCP-MCNC: 3.3 G/DL (ref 3.5–5.2)
ALP SERPL-CCNC: 122 UNIT/L (ref 40–150)
ALT SERPL W/O P-5'-P-CCNC: 19 UNIT/L (ref 10–44)
ANION GAP (OHS): 10 MMOL/L (ref 8–16)
AST SERPL-CCNC: 46 UNIT/L (ref 11–45)
BASOPHILS # BLD AUTO: 0.01 K/UL
BASOPHILS NFR BLD AUTO: 0.2 %
BILIRUB DIRECT SERPL-MCNC: 0.4 MG/DL (ref 0.1–0.3)
BILIRUB SERPL-MCNC: 0.9 MG/DL (ref 0.1–1)
BUN SERPL-MCNC: 13 MG/DL (ref 8–23)
CALCIUM SERPL-MCNC: 9.2 MG/DL (ref 8.7–10.5)
CHLORIDE SERPL-SCNC: 106 MMOL/L (ref 95–110)
CO2 SERPL-SCNC: 23 MMOL/L (ref 23–29)
CREAT SERPL-MCNC: 0.7 MG/DL (ref 0.5–1.4)
ERYTHROCYTE [DISTWIDTH] IN BLOOD BY AUTOMATED COUNT: 14.4 % (ref 11.5–14.5)
GFR SERPLBLD CREATININE-BSD FMLA CKD-EPI: >60 ML/MIN/1.73/M2
GLUCOSE SERPL-MCNC: 223 MG/DL (ref 70–110)
HCT VFR BLD AUTO: 40.7 % (ref 37–48.5)
HGB BLD-MCNC: 13.3 GM/DL (ref 12–16)
IMM GRANULOCYTES # BLD AUTO: 0.01 K/UL (ref 0–0.04)
IMM GRANULOCYTES NFR BLD AUTO: 0.2 % (ref 0–0.5)
INR PPP: 1.1 (ref 0.8–1.2)
LYMPHOCYTES # BLD AUTO: 1.42 K/UL (ref 1–4.8)
MCH RBC QN AUTO: 33.5 PG (ref 27–31)
MCHC RBC AUTO-ENTMCNC: 32.7 G/DL (ref 32–36)
MCV RBC AUTO: 103 FL (ref 82–98)
NUCLEATED RBC (/100WBC) (OHS): 0 /100 WBC
PLATELET # BLD AUTO: 127 K/UL (ref 150–450)
PMV BLD AUTO: 11.4 FL (ref 9.2–12.9)
POTASSIUM SERPL-SCNC: 3.6 MMOL/L (ref 3.5–5.1)
PROT SERPL-MCNC: 8.4 GM/DL (ref 6–8.4)
PROTHROMBIN TIME: 11.9 SECONDS (ref 9–12.5)
RBC # BLD AUTO: 3.97 M/UL (ref 4–5.4)
RELATIVE EOSINOPHIL (OHS): 3.3 %
RELATIVE LYMPHOCYTE (OHS): 27.9 % (ref 18–48)
RELATIVE MONOCYTE (OHS): 9 % (ref 4–15)
RELATIVE NEUTROPHIL (OHS): 59.4 % (ref 38–73)
SODIUM SERPL-SCNC: 139 MMOL/L (ref 136–145)
TSH SERPL-ACNC: 0.84 UIU/ML (ref 0.4–4)
WBC # BLD AUTO: 5.09 K/UL (ref 3.9–12.7)

## 2025-08-27 PROCEDURE — 82248 BILIRUBIN DIRECT: CPT

## 2025-08-27 PROCEDURE — 80053 COMPREHEN METABOLIC PANEL: CPT

## 2025-08-27 PROCEDURE — 85610 PROTHROMBIN TIME: CPT

## 2025-08-27 PROCEDURE — 82105 ALPHA-FETOPROTEIN SERUM: CPT

## 2025-08-27 PROCEDURE — 84443 ASSAY THYROID STIM HORMONE: CPT

## 2025-08-27 PROCEDURE — 85025 COMPLETE CBC W/AUTO DIFF WBC: CPT

## 2025-08-27 PROCEDURE — 36415 COLL VENOUS BLD VENIPUNCTURE: CPT
